# Patient Record
Sex: MALE | Race: WHITE | Employment: UNEMPLOYED | URBAN - METROPOLITAN AREA
[De-identification: names, ages, dates, MRNs, and addresses within clinical notes are randomized per-mention and may not be internally consistent; named-entity substitution may affect disease eponyms.]

---

## 2021-08-06 ENCOUNTER — APPOINTMENT (OUTPATIENT)
Dept: CT IMAGING | Age: 26
DRG: 897 | End: 2021-08-06
Payer: COMMERCIAL

## 2021-08-06 ENCOUNTER — HOSPITAL ENCOUNTER (INPATIENT)
Age: 26
LOS: 2 days | Discharge: HOME | DRG: 897 | End: 2021-08-08
Attending: EMERGENCY MEDICINE | Admitting: FAMILY MEDICINE
Payer: COMMERCIAL

## 2021-08-06 DIAGNOSIS — R56.9 SEIZURES (HCC): ICD-10-CM

## 2021-08-06 DIAGNOSIS — F10.939 ALCOHOL WITHDRAWAL SYNDROME WITH COMPLICATION (HCC): Primary | ICD-10-CM

## 2021-08-06 LAB
ACETAMINOPHEN LEVEL: 11.1 MCG/ML (ref 10–30)
ALBUMIN SERPL-MCNC: 4.8 G/DL (ref 3.5–5.2)
ALP BLD-CCNC: 120 U/L (ref 40–129)
ALT SERPL-CCNC: 47 U/L (ref 0–40)
AMPHETAMINE SCREEN, URINE: NOT DETECTED
ANION GAP SERPL CALCULATED.3IONS-SCNC: 11 MMOL/L (ref 7–16)
AST SERPL-CCNC: 39 U/L (ref 0–39)
BACTERIA: ABNORMAL /HPF
BARBITURATE SCREEN URINE: NOT DETECTED
BASOPHILS ABSOLUTE: 0.06 E9/L (ref 0–0.2)
BASOPHILS RELATIVE PERCENT: 0.8 % (ref 0–2)
BENZODIAZEPINE SCREEN, URINE: NOT DETECTED
BILIRUB SERPL-MCNC: 1 MG/DL (ref 0–1.2)
BILIRUBIN URINE: ABNORMAL
BLOOD, URINE: ABNORMAL
BUN BLDV-MCNC: 15 MG/DL (ref 6–20)
CALCIUM SERPL-MCNC: 9.9 MG/DL (ref 8.6–10.2)
CANNABINOID SCREEN URINE: POSITIVE
CHLORIDE BLD-SCNC: 96 MMOL/L (ref 98–107)
CLARITY: CLEAR
CO2: 32 MMOL/L (ref 22–29)
COCAINE METABOLITE SCREEN URINE: NOT DETECTED
COLOR: ABNORMAL
CREAT SERPL-MCNC: 0.8 MG/DL (ref 0.7–1.2)
EOSINOPHILS ABSOLUTE: 0.4 E9/L (ref 0.05–0.5)
EOSINOPHILS RELATIVE PERCENT: 5.4 % (ref 0–6)
EPITHELIAL CELLS, UA: ABNORMAL /HPF
ETHANOL: <10 MG/DL (ref 0–0.08)
FENTANYL SCREEN, URINE: NOT DETECTED
GFR AFRICAN AMERICAN: >60
GFR NON-AFRICAN AMERICAN: >60 ML/MIN/1.73
GLUCOSE BLD-MCNC: 107 MG/DL (ref 74–99)
GLUCOSE URINE: 100 MG/DL
HCT VFR BLD CALC: 45.1 % (ref 37–54)
HEMOGLOBIN: 15.9 G/DL (ref 12.5–16.5)
IMMATURE GRANULOCYTES #: 0.01 E9/L
IMMATURE GRANULOCYTES %: 0.1 % (ref 0–5)
KETONES, URINE: ABNORMAL MG/DL
LACTIC ACID: 0.9 MMOL/L (ref 0.5–2.2)
LEUKOCYTE ESTERASE, URINE: NEGATIVE
LYMPHOCYTES ABSOLUTE: 2.9 E9/L (ref 1.5–4)
LYMPHOCYTES RELATIVE PERCENT: 39.4 % (ref 20–42)
Lab: ABNORMAL
MAGNESIUM: 2 MG/DL (ref 1.6–2.6)
MCH RBC QN AUTO: 32.1 PG (ref 26–35)
MCHC RBC AUTO-ENTMCNC: 35.3 % (ref 32–34.5)
MCV RBC AUTO: 91.1 FL (ref 80–99.9)
METHADONE SCREEN, URINE: NOT DETECTED
MONOCYTES ABSOLUTE: 0.86 E9/L (ref 0.1–0.95)
MONOCYTES RELATIVE PERCENT: 11.7 % (ref 2–12)
NEUTROPHILS ABSOLUTE: 3.13 E9/L (ref 1.8–7.3)
NEUTROPHILS RELATIVE PERCENT: 42.6 % (ref 43–80)
NITRITE, URINE: NEGATIVE
OPIATE SCREEN URINE: NOT DETECTED
OXYCODONE URINE: NOT DETECTED
PDW BLD-RTO: 11.3 FL (ref 11.5–15)
PH UA: 8.5 (ref 5–9)
PHENCYCLIDINE SCREEN URINE: NOT DETECTED
PLATELET # BLD: 213 E9/L (ref 130–450)
PMV BLD AUTO: 9.3 FL (ref 7–12)
POTASSIUM SERPL-SCNC: 3.4 MMOL/L (ref 3.5–5)
PROTEIN UA: 30 MG/DL
RBC # BLD: 4.95 E12/L (ref 3.8–5.8)
RBC UA: ABNORMAL /HPF (ref 0–2)
SALICYLATE, SERUM: <0.3 MG/DL (ref 0–30)
SODIUM BLD-SCNC: 139 MMOL/L (ref 132–146)
SPECIFIC GRAVITY UA: 1.01 (ref 1–1.03)
TOTAL PROTEIN: 7.2 G/DL (ref 6.4–8.3)
TRICYCLIC ANTIDEPRESSANTS SCREEN SERUM: NEGATIVE NG/ML
TROPONIN, HIGH SENSITIVITY: <6 NG/L (ref 0–11)
UROBILINOGEN, URINE: 1 E.U./DL
WBC # BLD: 7.4 E9/L (ref 4.5–11.5)
WBC UA: ABNORMAL /HPF (ref 0–5)

## 2021-08-06 PROCEDURE — 85025 COMPLETE CBC W/AUTO DIFF WBC: CPT

## 2021-08-06 PROCEDURE — 6370000000 HC RX 637 (ALT 250 FOR IP): Performed by: EMERGENCY MEDICINE

## 2021-08-06 PROCEDURE — 82077 ASSAY SPEC XCP UR&BREATH IA: CPT

## 2021-08-06 PROCEDURE — 70450 CT HEAD/BRAIN W/O DYE: CPT

## 2021-08-06 PROCEDURE — 93005 ELECTROCARDIOGRAM TRACING: CPT | Performed by: NURSE PRACTITIONER

## 2021-08-06 PROCEDURE — 80179 DRUG ASSAY SALICYLATE: CPT

## 2021-08-06 PROCEDURE — 83735 ASSAY OF MAGNESIUM: CPT

## 2021-08-06 PROCEDURE — 6360000002 HC RX W HCPCS: Performed by: NURSE PRACTITIONER

## 2021-08-06 PROCEDURE — 84484 ASSAY OF TROPONIN QUANT: CPT

## 2021-08-06 PROCEDURE — 81001 URINALYSIS AUTO W/SCOPE: CPT

## 2021-08-06 PROCEDURE — 80307 DRUG TEST PRSMV CHEM ANLYZR: CPT

## 2021-08-06 PROCEDURE — 80053 COMPREHEN METABOLIC PANEL: CPT

## 2021-08-06 PROCEDURE — 83605 ASSAY OF LACTIC ACID: CPT

## 2021-08-06 PROCEDURE — 2140000000 HC CCU INTERMEDIATE R&B

## 2021-08-06 PROCEDURE — 99283 EMERGENCY DEPT VISIT LOW MDM: CPT

## 2021-08-06 PROCEDURE — 80143 DRUG ASSAY ACETAMINOPHEN: CPT

## 2021-08-06 PROCEDURE — 72125 CT NECK SPINE W/O DYE: CPT

## 2021-08-06 PROCEDURE — 6360000002 HC RX W HCPCS: Performed by: EMERGENCY MEDICINE

## 2021-08-06 RX ORDER — POTASSIUM CHLORIDE 20 MEQ/1
40 TABLET, EXTENDED RELEASE ORAL ONCE
Status: COMPLETED | OUTPATIENT
Start: 2021-08-06 | End: 2021-08-06

## 2021-08-06 RX ORDER — MAGNESIUM HYDROXIDE/ALUMINUM HYDROXICE/SIMETHICONE 120; 1200; 1200 MG/30ML; MG/30ML; MG/30ML
30 SUSPENSION ORAL
Status: DISCONTINUED | OUTPATIENT
Start: 2021-08-07 | End: 2021-08-08 | Stop reason: HOSPADM

## 2021-08-06 RX ORDER — PANTOPRAZOLE SODIUM 40 MG/10ML
40 INJECTION, POWDER, LYOPHILIZED, FOR SOLUTION INTRAVENOUS DAILY
Status: DISCONTINUED | OUTPATIENT
Start: 2021-08-07 | End: 2021-08-08

## 2021-08-06 RX ORDER — OXCARBAZEPINE 300 MG/1
300 TABLET, FILM COATED ORAL 2 TIMES DAILY
Status: DISCONTINUED | OUTPATIENT
Start: 2021-08-07 | End: 2021-08-08 | Stop reason: HOSPADM

## 2021-08-06 RX ORDER — OXCARBAZEPINE 300 MG/1
300 TABLET, FILM COATED ORAL 2 TIMES DAILY
COMMUNITY

## 2021-08-06 RX ORDER — GAUZE BANDAGE 2" X 2"
100 BANDAGE TOPICAL DAILY
Status: DISCONTINUED | OUTPATIENT
Start: 2021-08-07 | End: 2021-08-08 | Stop reason: HOSPADM

## 2021-08-06 RX ORDER — SODIUM CHLORIDE 9 MG/ML
25 INJECTION, SOLUTION INTRAVENOUS PRN
Status: DISCONTINUED | OUTPATIENT
Start: 2021-08-06 | End: 2021-08-08 | Stop reason: HOSPADM

## 2021-08-06 RX ORDER — LORAZEPAM 2 MG/ML
3 INJECTION INTRAMUSCULAR
Status: DISCONTINUED | OUTPATIENT
Start: 2021-08-06 | End: 2021-08-08 | Stop reason: HOSPADM

## 2021-08-06 RX ORDER — ESCITALOPRAM OXALATE 10 MG/1
10 TABLET ORAL DAILY
Status: DISCONTINUED | OUTPATIENT
Start: 2021-08-07 | End: 2021-08-08 | Stop reason: HOSPADM

## 2021-08-06 RX ORDER — LORAZEPAM 1 MG/1
4 TABLET ORAL
Status: DISCONTINUED | OUTPATIENT
Start: 2021-08-06 | End: 2021-08-08 | Stop reason: HOSPADM

## 2021-08-06 RX ORDER — SODIUM CHLORIDE 0.9 % (FLUSH) 0.9 %
5-40 SYRINGE (ML) INJECTION PRN
Status: DISCONTINUED | OUTPATIENT
Start: 2021-08-06 | End: 2021-08-08 | Stop reason: HOSPADM

## 2021-08-06 RX ORDER — LORAZEPAM 2 MG/ML
4 INJECTION INTRAMUSCULAR
Status: DISCONTINUED | OUTPATIENT
Start: 2021-08-06 | End: 2021-08-08 | Stop reason: HOSPADM

## 2021-08-06 RX ORDER — FOLIC ACID 1 MG/1
1 TABLET ORAL ONCE
Status: COMPLETED | OUTPATIENT
Start: 2021-08-06 | End: 2021-08-06

## 2021-08-06 RX ORDER — SODIUM CHLORIDE 0.9 % (FLUSH) 0.9 %
5-40 SYRINGE (ML) INJECTION PRN
Status: DISCONTINUED | OUTPATIENT
Start: 2021-08-06 | End: 2021-08-06 | Stop reason: SDUPTHER

## 2021-08-06 RX ORDER — LORAZEPAM 1 MG/1
3 TABLET ORAL
Status: DISCONTINUED | OUTPATIENT
Start: 2021-08-06 | End: 2021-08-08 | Stop reason: HOSPADM

## 2021-08-06 RX ORDER — LORAZEPAM 1 MG/1
2 TABLET ORAL
Status: DISCONTINUED | OUTPATIENT
Start: 2021-08-06 | End: 2021-08-08 | Stop reason: HOSPADM

## 2021-08-06 RX ORDER — MULTIVITAMIN WITH IRON
1 TABLET ORAL DAILY
Status: DISCONTINUED | OUTPATIENT
Start: 2021-08-07 | End: 2021-08-08 | Stop reason: HOSPADM

## 2021-08-06 RX ORDER — SODIUM CHLORIDE 0.9 % (FLUSH) 0.9 %
5-40 SYRINGE (ML) INJECTION EVERY 12 HOURS SCHEDULED
Status: DISCONTINUED | OUTPATIENT
Start: 2021-08-07 | End: 2021-08-08 | Stop reason: HOSPADM

## 2021-08-06 RX ORDER — ESCITALOPRAM OXALATE 10 MG/1
10 TABLET ORAL DAILY
COMMUNITY

## 2021-08-06 RX ORDER — SODIUM CHLORIDE 0.9 % (FLUSH) 0.9 %
5-40 SYRINGE (ML) INJECTION EVERY 12 HOURS SCHEDULED
Status: DISCONTINUED | OUTPATIENT
Start: 2021-08-06 | End: 2021-08-06 | Stop reason: SDUPTHER

## 2021-08-06 RX ORDER — LORAZEPAM 2 MG/ML
1 INJECTION INTRAMUSCULAR
Status: DISCONTINUED | OUTPATIENT
Start: 2021-08-06 | End: 2021-08-08 | Stop reason: HOSPADM

## 2021-08-06 RX ORDER — LORAZEPAM 1 MG/1
1 TABLET ORAL
Status: DISCONTINUED | OUTPATIENT
Start: 2021-08-06 | End: 2021-08-08 | Stop reason: HOSPADM

## 2021-08-06 RX ORDER — LORAZEPAM 2 MG/ML
2 INJECTION INTRAMUSCULAR
Status: DISCONTINUED | OUTPATIENT
Start: 2021-08-06 | End: 2021-08-08 | Stop reason: HOSPADM

## 2021-08-06 RX ORDER — SODIUM CHLORIDE 9 MG/ML
25 INJECTION, SOLUTION INTRAVENOUS PRN
Status: DISCONTINUED | OUTPATIENT
Start: 2021-08-06 | End: 2021-08-06 | Stop reason: SDUPTHER

## 2021-08-06 RX ORDER — SODIUM CHLORIDE 9 MG/ML
10 INJECTION INTRAVENOUS DAILY
Status: DISCONTINUED | OUTPATIENT
Start: 2021-08-07 | End: 2021-08-08 | Stop reason: HOSPADM

## 2021-08-06 RX ORDER — THIAMINE HYDROCHLORIDE 100 MG/ML
100 INJECTION, SOLUTION INTRAMUSCULAR; INTRAVENOUS ONCE
Status: COMPLETED | OUTPATIENT
Start: 2021-08-06 | End: 2021-08-06

## 2021-08-06 RX ADMIN — FOLIC ACID 1 MG: 1 TABLET ORAL at 22:03

## 2021-08-06 RX ADMIN — THIAMINE HYDROCHLORIDE 100 MG: 100 INJECTION, SOLUTION INTRAMUSCULAR; INTRAVENOUS at 22:02

## 2021-08-06 RX ADMIN — LORAZEPAM 2 MG: 2 INJECTION INTRAMUSCULAR; INTRAVENOUS at 22:02

## 2021-08-06 RX ADMIN — POTASSIUM CHLORIDE 40 MEQ: 1500 TABLET, EXTENDED RELEASE ORAL at 22:03

## 2021-08-06 ASSESSMENT — PAIN DESCRIPTION - LOCATION
LOCATION: ABDOMEN;HEAD;LEG
LOCATION: ABDOMEN

## 2021-08-06 ASSESSMENT — PAIN DESCRIPTION - PAIN TYPE
TYPE: ACUTE PAIN
TYPE: ACUTE PAIN

## 2021-08-06 ASSESSMENT — PAIN SCALES - GENERAL
PAINLEVEL_OUTOF10: 5
PAINLEVEL_OUTOF10: 6

## 2021-08-06 ASSESSMENT — PAIN DESCRIPTION - DESCRIPTORS: DESCRIPTORS: STABBING

## 2021-08-06 NOTE — ED NOTES
FIRST PROVIDER CONTACT ASSESSMENT NOTE                                                                                                Department of Emergency Medicine                                                      First Provider Note  21  6:34 PM EDT  NAME: Miley Paulino  : 1995  MRN: [id number]    Chief Complaint: Alcohol Problem (drinking since Monday, no alcohol in two days, had 2 seizures today.) and Seizures      History of Present Illness:   Miley Paulino is a 23 y.o. male who presents to the ED for seizures related to alcohol withdrawal.  Patient reports that he was sober/clean for 90 days but relapsed on Monday, going back to drinking alcohol, 2/5 of whiskey a day. He reports that he did not drink the last 2 days and today he has had 3 seizures. Patient denies any drug use. Focused Physical Exam:  VS:    ED Triage Vitals   BP Temp Temp src Pulse Resp SpO2 Height Weight   21 1833 21 -- 21 18121 1833 08/06/21 18121 1833 08/06/21 1833   (!) 127/101 98.2 °F (36.8 °C)  88 16 95 % 5' 7\" (1.702 m) 130 lb (59 kg)        General: Alert and in no apparent distress. Medical History:  has no past medical history on file. Surgical History:  has no past surgical history on file. Social History:      Family History: family history is not on file. Allergies: Patient has no known allergies.      Initial Plan of Care:  Initiate Treatment-Testing, Proceed toTreatment Area When Bed Available for ED Attending/MLP to Continue Care    -------------------------------------------------END OF FIRST PROVIDER CONTACT ASSESSMENT NOTE--------------------------------------------------------  Electronically signed by SANDRA Stewart CNP   DD: 21         SANDRA Stewart CNP  21 1835

## 2021-08-07 LAB
ALBUMIN SERPL-MCNC: 4.6 G/DL (ref 3.5–5.2)
ALP BLD-CCNC: 103 U/L (ref 40–129)
ALT SERPL-CCNC: 43 U/L (ref 0–40)
ANION GAP SERPL CALCULATED.3IONS-SCNC: 10 MMOL/L (ref 7–16)
AST SERPL-CCNC: 39 U/L (ref 0–39)
BASOPHILS ABSOLUTE: 0.06 E9/L (ref 0–0.2)
BASOPHILS RELATIVE PERCENT: 0.7 % (ref 0–2)
BILIRUB SERPL-MCNC: 0.9 MG/DL (ref 0–1.2)
BUN BLDV-MCNC: 16 MG/DL (ref 6–20)
CALCIUM SERPL-MCNC: 9.7 MG/DL (ref 8.6–10.2)
CHLORIDE BLD-SCNC: 99 MMOL/L (ref 98–107)
CO2: 30 MMOL/L (ref 22–29)
CREAT SERPL-MCNC: 0.9 MG/DL (ref 0.7–1.2)
EOSINOPHILS ABSOLUTE: 0.5 E9/L (ref 0.05–0.5)
EOSINOPHILS RELATIVE PERCENT: 5.7 % (ref 0–6)
GFR AFRICAN AMERICAN: >60
GFR NON-AFRICAN AMERICAN: >60 ML/MIN/1.73
GLUCOSE BLD-MCNC: 89 MG/DL (ref 74–99)
HCT VFR BLD CALC: 43.3 % (ref 37–54)
HEMOGLOBIN: 15.2 G/DL (ref 12.5–16.5)
IMMATURE GRANULOCYTES #: 0.01 E9/L
IMMATURE GRANULOCYTES %: 0.1 % (ref 0–5)
LIPASE: 20 U/L (ref 13–60)
LYMPHOCYTES ABSOLUTE: 3.28 E9/L (ref 1.5–4)
LYMPHOCYTES RELATIVE PERCENT: 37.2 % (ref 20–42)
MCH RBC QN AUTO: 31.9 PG (ref 26–35)
MCHC RBC AUTO-ENTMCNC: 35.1 % (ref 32–34.5)
MCV RBC AUTO: 91 FL (ref 80–99.9)
MONOCYTES ABSOLUTE: 0.83 E9/L (ref 0.1–0.95)
MONOCYTES RELATIVE PERCENT: 9.4 % (ref 2–12)
NEUTROPHILS ABSOLUTE: 4.13 E9/L (ref 1.8–7.3)
NEUTROPHILS RELATIVE PERCENT: 46.9 % (ref 43–80)
PDW BLD-RTO: 11.3 FL (ref 11.5–15)
PLATELET # BLD: 192 E9/L (ref 130–450)
PMV BLD AUTO: 9.5 FL (ref 7–12)
POTASSIUM REFLEX MAGNESIUM: 3.8 MMOL/L (ref 3.5–5)
RBC # BLD: 4.76 E12/L (ref 3.8–5.8)
SODIUM BLD-SCNC: 139 MMOL/L (ref 132–146)
TOTAL PROTEIN: 6.7 G/DL (ref 6.4–8.3)
WBC # BLD: 8.8 E9/L (ref 4.5–11.5)

## 2021-08-07 PROCEDURE — 2140000000 HC CCU INTERMEDIATE R&B

## 2021-08-07 PROCEDURE — 85025 COMPLETE CBC W/AUTO DIFF WBC: CPT

## 2021-08-07 PROCEDURE — 80053 COMPREHEN METABOLIC PANEL: CPT

## 2021-08-07 PROCEDURE — 2500000003 HC RX 250 WO HCPCS: Performed by: FAMILY MEDICINE

## 2021-08-07 PROCEDURE — 2580000003 HC RX 258: Performed by: STUDENT IN AN ORGANIZED HEALTH CARE EDUCATION/TRAINING PROGRAM

## 2021-08-07 PROCEDURE — 6370000000 HC RX 637 (ALT 250 FOR IP): Performed by: STUDENT IN AN ORGANIZED HEALTH CARE EDUCATION/TRAINING PROGRAM

## 2021-08-07 PROCEDURE — C9113 INJ PANTOPRAZOLE SODIUM, VIA: HCPCS | Performed by: FAMILY MEDICINE

## 2021-08-07 PROCEDURE — 2580000003 HC RX 258: Performed by: FAMILY MEDICINE

## 2021-08-07 PROCEDURE — 6360000002 HC RX W HCPCS: Performed by: STUDENT IN AN ORGANIZED HEALTH CARE EDUCATION/TRAINING PROGRAM

## 2021-08-07 PROCEDURE — 36415 COLL VENOUS BLD VENIPUNCTURE: CPT

## 2021-08-07 PROCEDURE — 83690 ASSAY OF LIPASE: CPT

## 2021-08-07 PROCEDURE — 6360000002 HC RX W HCPCS: Performed by: FAMILY MEDICINE

## 2021-08-07 PROCEDURE — 6370000000 HC RX 637 (ALT 250 FOR IP): Performed by: FAMILY MEDICINE

## 2021-08-07 RX ORDER — POTASSIUM CHLORIDE 20 MEQ/1
40 TABLET, EXTENDED RELEASE ORAL 2 TIMES DAILY WITH MEALS
Status: DISCONTINUED | OUTPATIENT
Start: 2021-08-07 | End: 2021-08-07

## 2021-08-07 RX ORDER — ACETAMINOPHEN 325 MG/1
650 TABLET ORAL EVERY 4 HOURS PRN
Status: DISCONTINUED | OUTPATIENT
Start: 2021-08-07 | End: 2021-08-08 | Stop reason: HOSPADM

## 2021-08-07 RX ORDER — SODIUM CHLORIDE 9 MG/ML
INJECTION, SOLUTION INTRAVENOUS CONTINUOUS
Status: DISCONTINUED | OUTPATIENT
Start: 2021-08-07 | End: 2021-08-08 | Stop reason: HOSPADM

## 2021-08-07 RX ADMIN — ESCITALOPRAM 10 MG: 10 TABLET, FILM COATED ORAL at 09:28

## 2021-08-07 RX ADMIN — SODIUM CHLORIDE: 9 INJECTION, SOLUTION INTRAVENOUS at 19:16

## 2021-08-07 RX ADMIN — SODIUM CHLORIDE, PRESERVATIVE FREE 10 ML: 5 INJECTION INTRAVENOUS at 00:56

## 2021-08-07 RX ADMIN — OXCARBAZEPINE 300 MG: 300 TABLET, FILM COATED ORAL at 09:28

## 2021-08-07 RX ADMIN — POTASSIUM CHLORIDE 40 MEQ: 1500 TABLET, EXTENDED RELEASE ORAL at 08:45

## 2021-08-07 RX ADMIN — Medication 1 TABLET: at 16:42

## 2021-08-07 RX ADMIN — FOLIC ACID: 5 INJECTION, SOLUTION INTRAMUSCULAR; INTRAVENOUS; SUBCUTANEOUS at 00:57

## 2021-08-07 RX ADMIN — MAGNESIUM HYDROXIDE/ALUMINUM HYDROXICE/SIMETHICONE 30 ML: 120; 1200; 1200 SUSPENSION ORAL at 00:56

## 2021-08-07 RX ADMIN — LORAZEPAM 2 MG: 1 TABLET ORAL at 09:27

## 2021-08-07 RX ADMIN — PANTOPRAZOLE SODIUM 40 MG: 40 INJECTION, POWDER, FOR SOLUTION INTRAVENOUS at 08:21

## 2021-08-07 RX ADMIN — MAGNESIUM HYDROXIDE/ALUMINUM HYDROXICE/SIMETHICONE 30 ML: 120; 1200; 1200 SUSPENSION ORAL at 08:20

## 2021-08-07 RX ADMIN — SODIUM CHLORIDE: 9 INJECTION, SOLUTION INTRAVENOUS at 08:23

## 2021-08-07 RX ADMIN — MAGNESIUM HYDROXIDE/ALUMINUM HYDROXICE/SIMETHICONE 30 ML: 120; 1200; 1200 SUSPENSION ORAL at 17:24

## 2021-08-07 RX ADMIN — ENOXAPARIN SODIUM 40 MG: 40 INJECTION SUBCUTANEOUS at 12:17

## 2021-08-07 RX ADMIN — OXCARBAZEPINE 300 MG: 300 TABLET, FILM COATED ORAL at 20:39

## 2021-08-07 RX ADMIN — OXCARBAZEPINE 300 MG: 300 TABLET, FILM COATED ORAL at 00:56

## 2021-08-07 RX ADMIN — MAGNESIUM HYDROXIDE/ALUMINUM HYDROXICE/SIMETHICONE 30 ML: 120; 1200; 1200 SUSPENSION ORAL at 20:38

## 2021-08-07 RX ADMIN — LORAZEPAM 2 MG: 1 TABLET ORAL at 06:46

## 2021-08-07 RX ADMIN — SODIUM CHLORIDE 10 ML: 9 INJECTION, SOLUTION INTRAMUSCULAR; INTRAVENOUS; SUBCUTANEOUS at 08:21

## 2021-08-07 RX ADMIN — LORAZEPAM 2 MG: 1 TABLET ORAL at 08:20

## 2021-08-07 RX ADMIN — ACETAMINOPHEN 650 MG: 325 TABLET ORAL at 08:38

## 2021-08-07 RX ADMIN — Medication 100 MG: at 09:28

## 2021-08-07 RX ADMIN — MAGNESIUM HYDROXIDE/ALUMINUM HYDROXICE/SIMETHICONE 30 ML: 120; 1200; 1200 SUSPENSION ORAL at 12:00

## 2021-08-07 RX ADMIN — Medication 10 ML: at 20:39

## 2021-08-07 RX ADMIN — Medication 10 ML: at 08:46

## 2021-08-07 ASSESSMENT — PAIN DESCRIPTION - LOCATION
LOCATION: BACK
LOCATION: BACK;HEAD
LOCATION: HEAD

## 2021-08-07 ASSESSMENT — PAIN SCALES - GENERAL
PAINLEVEL_OUTOF10: 6
PAINLEVEL_OUTOF10: 5
PAINLEVEL_OUTOF10: 0
PAINLEVEL_OUTOF10: 6
PAINLEVEL_OUTOF10: 0
PAINLEVEL_OUTOF10: 4
PAINLEVEL_OUTOF10: 0
PAINLEVEL_OUTOF10: 0

## 2021-08-07 ASSESSMENT — PAIN DESCRIPTION - DESCRIPTORS
DESCRIPTORS: STABBING;DISCOMFORT;CONSTANT
DESCRIPTORS: STABBING

## 2021-08-07 ASSESSMENT — PAIN DESCRIPTION - PAIN TYPE
TYPE: ACUTE PAIN

## 2021-08-07 NOTE — PLAN OF CARE
Problem: Pain:  Goal: Pain level will decrease  Description: Pain level will decrease  8/7/2021 1029 by Sharron Sandhoff, RN  Outcome: Met This Shift     Problem: Pain:  Goal: Control of acute pain  Description: Control of acute pain  Outcome: Met This Shift     Problem: Pain:  Goal: Control of chronic pain  Description: Control of chronic pain  Outcome: Met This Shift

## 2021-08-07 NOTE — ED NOTES
Provider at [facility] to assess at this time     Jeremiah Malave RN  08/06/21 1703

## 2021-08-07 NOTE — PLAN OF CARE
Problem: Pain:  Goal: Pain level will decrease  Description: Pain level will decrease  8/7/2021 1029 by Valdo Caldwell RN  Outcome: Met This Shift     Problem: Pain:  Goal: Control of acute pain  Description: Control of acute pain  Outcome: Met This Shift     Problem: Pain:  Goal: Control of chronic pain  Description: Control of chronic pain  8/7/2021 1538 by Valdo Caldwell RN  Outcome: Met This Shift     Problem: Falls - Risk of:  Goal: Will remain free from falls  Description: Will remain free from falls  8/7/2021 1538 by Valdo Caldwell RN  Outcome: Met This Shift

## 2021-08-07 NOTE — ED NOTES
[facility] faxed to floor, pt updated on Room assignment. Transport assigned. Floor called an made aware of pts arrival Pt with NAD upon admit.  Belongings sent with pt     Adilson Nelson RN  08/06/21 3798

## 2021-08-07 NOTE — PLAN OF CARE
Problem: Pain:  Goal: Pain level will decrease  Description: Pain level will decrease  Outcome: Ongoing     Problem: Falls - Risk of:  Goal: Will remain free from falls  Description: Will remain free from falls  Outcome: Met This Shift     Problem: Falls - Risk of:  Goal: Absence of physical injury  Description: Absence of physical injury  Outcome: Met This Shift

## 2021-08-07 NOTE — PROGRESS NOTES
Hospitalist Progress Note      SYNOPSIS: Patient admitted on 2021 for impending DT's. SUBJECTIVE:    Patient seen and examined at bedside in NAD. Noted to have mild tremors in B/L hands when outstretched; however, pt denies any anxiety, auditory or visual hallucinations. No diaphoresis and vitals stable. Records reviewed. Stable overnight. No other overnight issues reported. Temp (24hrs), Av.3 °F (36.8 °C), Min:97.6 °F (36.4 °C), Max:98.7 °F (37.1 °C)    DIET: ADULT DIET; Regular; 5 carb choices (75 gm/meal)  CODE: No Order  No intake or output data in the 24 hours ending 21 1158    OBJECTIVE:    /72   Pulse 65   Temp 98 °F (36.7 °C) (Temporal)   Resp 16   Ht 5' 7\" (1.702 m)   Wt 126 lb 9.6 oz (57.4 kg)   SpO2 96%   BMI 19.83 kg/m²     General appearance: No apparent distress, appears stated age and cooperative. HEENT:  Conjunctivae/corneas clear. Neck: Supple. No jugular venous distention. Respiratory: Clear to auscultation bilaterally, normal respiratory effort  Cardiovascular: Regular rate rhythm, normal S1-S2  Abdomen: Soft, nontender, nondistended  Musculoskeletal: No clubbing, cyanosis, no bilateral lower extremity edema. Brisk capillary refill. Skin:  No rashes  on visible skin  Neurologic: awake, alert and following commands.  Mild tremors of B/L hands when outstretched    ASSESSMENT and PLAN:    Acute alcohol withdrawal with seizure  - Pt with two unwitnessed seizures prior to admission  - CT head without acute process  - CT cervical spine no acute fracture  - UDS pos for THC  - Pt endorses drinking ~1 Bottle of whiskey daily with last ~48H prior to admission  - Cont on CIWA protocol with Ativan protocol  - Cont with folate and thiamine with multivitamins  - Lipase WNL  - C/w Trileptal for seizures  - Counseled at length and in detail on alcohol cessation  - SW for substance abuse      Depression  - C/w Lexapro  - No current SI or HI     DVT Prophylaxis: Lovenox  Diet: Regular  Code Status: Full     DISPOSITION: Intermediate    Medications:  REVIEWED DAILY    Infusion Medications    sodium chloride 125 mL/hr at 08/07/21 0823    sodium chloride       Scheduled Medications    potassium chloride  40 mEq Oral BID WC    escitalopram  10 mg Oral Daily    OXcarbazepine  300 mg Oral BID    sodium chloride flush  5-40 mL Intravenous 2 times per day    thiamine  100 mg Oral Daily    multivitamin  1 tablet Oral Daily    pantoprazole  40 mg Intravenous Daily    And    sodium chloride (PF)  10 mL Intravenous Daily    aluminum & magnesium hydroxide-simethicone  30 mL Oral 4x Daily WC     PRN Meds: acetaminophen, LORazepam **OR** LORazepam **OR** LORazepam **OR** LORazepam **OR** LORazepam **OR** LORazepam **OR** LORazepam **OR** LORazepam, sodium chloride flush, sodium chloride    Labs:     Recent Labs     08/06/21 1925 08/07/21 0527   WBC 7.4 8.8   HGB 15.9 15.2   HCT 45.1 43.3    192       Recent Labs     08/06/21 1925 08/07/21  0527    139   K 3.4* 3.8   CL 96* 99   CO2 32* 30*   BUN 15 16   CREATININE 0.8 0.9   CALCIUM 9.9 9.7       Recent Labs     08/06/21 1925 08/07/21  0527   PROT 7.2 6.7   ALKPHOS 120 103   ALT 47* 43*   AST 39 39   BILITOT 1.0 0.9   LIPASE  --  20       No results for input(s): INR in the last 72 hours. No results for input(s): Katlin Prater in the last 72 hours. Chronic labs:    No results found for: CHOL, TRIG, HDL, LDLCALC, TSH, PSA, INR, LABA1C    Radiology: REVIEWED DAILY    +++++++++++++++++++++++++++++++++++++++++++++++++  Angelito Ness MD  [facility] Physician - [address] [place] [address]  +++++++++++++++++++++++++++++++++++++++++++++++++  NOTE: This report was transcribed using voice recognition software. Every effort was made to ensure accuracy; however, inadvertent computerized transcription errors may be present.

## 2021-08-07 NOTE — H&P
Hospital Medicine History & Physical      PCP: No primary care provider on file. Date of Admission: 8/6/2021    Date of Service: Pt seen/examined on 8/6/2021 and Admitted to Inpatient with expected LOS greater than two midnights due to medical therapy. Chief Complaint:  Alcohol withdrawal       History Of Present Illness:   23 y.o. male who presents to the ED due to alcohol withdrawal . Patient was in rehab for one month and then was discharged to a sober house . He states that he was removed from the sober house for an unknown reason. He states that he started to binge drink . He states that he drinks 2/5 whiskey daily . He states that he had 2 two unwitnessed seizures  Today . He states that he has a history of alcoholic seizures . Roberth Igor He states that his last drink was 48H . He reports abdominal pain  nausea vomiting fever chills and shortness of breath . He states that he had hallucinations 3 days ago but denies hallucinating now. He denies chest pain  Patient is afebrile blood pressure 127/101  95 % on room air , RR=16 pulse =88  . ALT 47 , AST 39 Alk phos 96 Trop <6  bilirubin  1.0 UDS positive for THC Ethanol level is neg  CT Head  reveals no acute process Ct cervical spine no acute fracture     Past Medical History:          Diagnosis Date    Alcohol abuse        Past Surgical History:          Procedure Laterality Date    APPENDECTOMY         Medications Prior to Admission:      Prior to Admission medications    Medication Sig Start Date End Date Taking? Authorizing Provider   escitalopram (LEXAPRO) 10 MG tablet Take 10 mg by mouth daily   Yes Historical Provider, MD   OXcarbazepine (TRILEPTAL) 300 MG tablet Take 300 mg by mouth 2 times daily   Yes Historical Provider, MD       Allergies:  Patient has no known allergies. Social History:      The patient currently lives with family     TOBACCO:   reports that he has been smoking.  He has never used smokeless tobacco.  ETOH:   reports current alcohol use. Family History:       Reviewed in detail and negative for DM, CAD, Cancer, CVA. Positive as follows:    History reviewed. No pertinent family history. REVIEW OF SYSTEMS:   Pertinent positives as noted in the HPI. All other systems reviewed and negative. PHYSICAL EXAM:    BP (!) 127/101   Pulse 88   Temp 98.2 °F (36.8 °C)   Resp 16   Ht 5' 7\" (1.702 m)   Wt 130 lb (59 kg)   SpO2 95%   BMI 20.36 kg/m²     General appearance:  No apparent distress, appears stated age and cooperative. HEENT:  Normal cephalic, atraumatic without obvious deformity. Pupils equal, round, and reactive to light. Extra ocular muscles intact. Conjunctivae/corneas clear. Neck: Supple, with full range of motion. No jugular venous distention. Trachea midline. Respiratory:  Normal respiratory effort. Clear to auscultation, bilaterally without Rales/Wheezes/Rhonchi. Cardiovascular:  Regular rate and rhythm with normal S1/S2 without murmurs, rubs or gallops. Abdomen: Soft, non-tender, non-distended with normal bowel sounds. Musculoskeletal:  No clubbing, cyanosis or edema bilaterally. Full range of motion without deformity. Skin: Skin color, texture, turgor normal.  No rashes or lesions. Neurologic:  Neurovascularly intact without any focal sensory/motor deficits. Cranial nerves: II-XII intact, grossly non-focal.  Psychiatric:  Alert and oriented, thought content appropriate, normal insight    Labs:     Recent Labs     08/06/21 1925   WBC 7.4   HGB 15.9   HCT 45.1        Recent Labs     08/06/21 1925      K 3.4*   CL 96*   CO2 32*   BUN 15   CREATININE 0.8   CALCIUM 9.9     Recent Labs     08/06/21 1925   AST 39   ALT 47*   BILITOT 1.0   ALKPHOS 120     No results for input(s): INR in the last 72 hours. No results for input(s): Reyne Uzbek in the last 72 hours.     Urinalysis:      Lab Results   Component Value Date    NITRU Negative 08/06/2021    WBCUA 5-10 08/06/2021    BACTERIA MODERATE 08/06/2021    RBCUA 2-5 08/06/2021    BLOODU TRACE-INTACT 08/06/2021    SPECGRAV 1.010 08/06/2021    GLUCOSEU 100 08/06/2021         ASSESSMENT:        Active Hospital Problems    Diagnosis Date Noted    Alcohol withdrawal seizure with complication ([facility] [address] [S98.840, R56.9] 08/06/2021       PLAN:     Acute alcohol withdrawal with seizure  ; Patient had two unwitnessed seizures . CT head no acute process. CT cervical spine no acute fracture . UDS pos for THC He drinks 2/5 whiskey daily last drink was  48H ago .  Admit inpatient vital telemetry seizure precaution  CIWA protocol  Folic acid  thiamine multivitamins monitor for seizures C/w Trileptal     Depression : C/w Lexapro         DVT Prophylaxis: SCDS   Diet: Regular   Code Status: Full   *       Tate Zamora MD

## 2021-08-07 NOTE — PROGRESS NOTES
Dr. Didi Chaves regarding headache and K 3.8 this am. Kclor 40 mEq ordered as well as tylenol. Dr. Miller Alu to see patient before restarting diet.

## 2021-08-07 NOTE — PATIENT CARE CONFERENCE
P Quality Flow/Interdisciplinary Rounds Progress Note        Quality Flow Rounds held on August 7, 2021    Disciplines Attending:  Bedside Nurse, ,  and Nursing Unit Leadership    Tomer Dempsey was admitted on 8/6/2021  8:45 PM    Anticipated Discharge Date:  Expected Discharge Date: 08/09/21    Disposition:    Johnny Score:  Johnny Scale Score: 21    Readmission Risk              Risk of Unplanned Readmission:  8           Discussed patient goal for the day, patient clinical progression, and barriers to discharge.   The following Goal(s) of the Day/Commitment(s) have been identified:  continue CIWA/IVF/vitamins      Candelaria Ortiz, RN  August 7, 2021

## 2021-08-07 NOTE — ED PROVIDER NOTES
HPI:  8/6/21,   Time: 9:50 PM EDT       Yady Lewis is a 23 y.o. male presenting to the ED for alcohol withdrawal, beginning 2 days ago. The complaint has been persistent, moderate in severity, and worsened by nothing. The patient states that he had been sober and then recently started drinking again. He states he drank for about 5 days straight drinking approximately 2/5 of whiskey daily. He states that he stopped drinking approximately 48 hours ago and had 2 witnessed seizures today therefore he came to the ED to be evaluated. He states he is feeling shaky nauseous having headaches and just not feeling good. Denies any chest pain or shortness of breath. No focal deficits. No SI or HI. No hallucinations. Review of Systems:   Pertinent positives and negatives are stated within HPI, all other systems reviewed and are negative.          --------------------------------------------- PAST HISTORY ---------------------------------------------  Past Medical History:  has a past medical history of Alcohol abuse. Past Surgical History:  has a past surgical history that includes Appendectomy. Social History:  reports that he has been smoking. He has never used smokeless tobacco. He reports current alcohol use. He reports that he does not use drugs. Family History: family history is not on file. The patients home medications have been reviewed. Allergies: Patient has no known allergies.         ---------------------------------------------------PHYSICAL EXAM--------------------------------------    Constitutional/General: Alert and oriented x3, well appearing, non toxic in NAD  Head: Normocephalic and atraumatic  Eyes: PERRL, EOMI, conjunctive normal, sclera non icteric  Mouth: Oropharynx clear, handling secretions, no trismus, no asymmetry of the posterior oropharynx or uvular edema  Neck: Supple, full ROM, non tender to palpation in the midline, no stridor, no crepitus, no meningeal signs  Respiratory: Lungs clear to auscultation bilaterally, no wheezes, rales, or rhonchi. Not in respiratory distress  Cardiovascular:  Regular rate. Regular rhythm. No murmurs, gallops, or rubs. 2+ distal pulses  GI:  Abdomen Soft, Non tender, Non distended. +BS. No organomegaly, no palpable masses,  No rebound, guarding, or rigidity. Musculoskeletal: Moves all extremities x 4. Warm and well perfused, no clubbing, cyanosis, or edema. Capillary refill <3 seconds  Integument: skin warm and dry. No rashes. Neurologic: GCS 15, no focal deficits, symmetric strength 5/5 in the upper and lower extremities bilaterally, sensation intact all 4 extremities, cranial nerves II to XII intact  Psychiatric: Normal Affect    -------------------------------------------------- RESULTS -------------------------------------------------  I have personally reviewed all laboratory and imaging results for this patient. Results are listed below.      LABS:  Results for orders placed or performed during the hospital encounter of 08/06/21   Troponin   Result Value Ref Range    Troponin, High Sensitivity <6 0 - 11 ng/L   CBC Auto Differential   Result Value Ref Range    WBC 7.4 4.5 - 11.5 E9/L    RBC 4.95 3.80 - 5.80 E12/L    Hemoglobin 15.9 12.5 - 16.5 g/dL    Hematocrit 45.1 37.0 - 54.0 %    MCV 91.1 80.0 - 99.9 fL    MCH 32.1 26.0 - 35.0 pg    MCHC 35.3 (H) 32.0 - 34.5 %    RDW 11.3 (L) 11.5 - 15.0 fL    Platelets 672 228 - 981 E9/L    MPV 9.3 7.0 - 12.0 fL    Neutrophils % 42.6 (L) 43.0 - 80.0 %    Immature Granulocytes % 0.1 0.0 - 5.0 %    Lymphocytes % 39.4 20.0 - 42.0 %    Monocytes % 11.7 2.0 - 12.0 %    Eosinophils % 5.4 0.0 - 6.0 %    Basophils % 0.8 0.0 - 2.0 %    Neutrophils Absolute 3.13 1.80 - 7.30 E9/L    Immature Granulocytes # 0.01 E9/L    Lymphocytes Absolute 2.90 1.50 - 4.00 E9/L    Monocytes Absolute 0.86 0.10 - 0.95 E9/L    Eosinophils Absolute 0.40 0.05 - 0.50 E9/L    Basophils Absolute 0.06 0.00 - 0.20 E9/L Comprehensive Metabolic Panel   Result Value Ref Range    Sodium 139 132 - 146 mmol/L    Potassium 3.4 (L) 3.5 - 5.0 mmol/L    Chloride 96 (L) 98 - 107 mmol/L    CO2 32 (H) 22 - 29 mmol/L    Anion Gap 11 7 - 16 mmol/L    Glucose 107 (H) 74 - 99 mg/dL    BUN 15 6 - 20 mg/dL    CREATININE 0.8 0.7 - 1.2 mg/dL    GFR Non-African American >60 >=60 mL/min/1.73    GFR African American >60     Calcium 9.9 8.6 - 10.2 mg/dL    Total Protein 7.2 6.4 - 8.3 g/dL    Albumin 4.8 3.5 - 5.2 g/dL    Total Bilirubin 1.0 0.0 - 1.2 mg/dL    Alkaline Phosphatase 120 40 - 129 U/L    ALT 47 (H) 0 - 40 U/L    AST 39 0 - 39 U/L   Magnesium   Result Value Ref Range    Magnesium 2.0 1.6 - 2.6 mg/dL   Lactic Acid, Plasma   Result Value Ref Range    Lactic Acid 0.9 0.5 - 2.2 mmol/L   Urinalysis   Result Value Ref Range    Color, UA DARK YELLOW (A) Straw/Yellow    Clarity, UA Clear Clear    Glucose, Ur 100 (A) Negative mg/dL    Bilirubin Urine SMALL (A) Negative    Ketones, Urine TRACE (A) Negative mg/dL    Specific Gravity, UA 1.010 1.005 - 1.030    Blood, Urine TRACE-INTACT Negative    pH, UA 8.5 5.0 - 9.0    Protein, UA 30 (A) Negative mg/dL    Urobilinogen, Urine 1.0 <2.0 E.U./dL    Nitrite, Urine Negative Negative    Leukocyte Esterase, Urine Negative Negative   Urine Drug Screen   Result Value Ref Range    Amphetamine Screen, Urine NOT DETECTED Negative <1000 ng/mL    Barbiturate Screen, Ur NOT DETECTED Negative < 200 ng/mL    Benzodiazepine Screen, Urine NOT DETECTED Negative < 200 ng/mL    Cannabinoid Scrn, Ur POSITIVE (A) Negative < 50ng/mL    Cocaine Metabolite Screen, Urine NOT DETECTED Negative < 300 ng/mL    Opiate Scrn, Ur NOT DETECTED Negative < 300ng/mL    PCP Screen, Urine NOT DETECTED Negative < 25 ng/mL    Methadone Screen, Urine NOT DETECTED Negative <300 ng/mL    Oxycodone Urine NOT DETECTED Negative <100 ng/mL    FENTANYL SCREEN, URINE NOT DETECTED Negative <1 ng/mL    Drug Screen Comment: see below    Serum Drug Screen Result Value Ref Range    Ethanol Lvl <10 mg/dL    Acetaminophen Level 11.1 10.0 - 44.4 mcg/mL    Salicylate, Serum <4.7 0.0 - 30.0 mg/dL    TCA Scrn NEGATIVE Cutoff:300 ng/mL   Microscopic Urinalysis   Result Value Ref Range    WBC, UA 5-10 (A) 0 - 5 /HPF    RBC, UA 2-5 0 - 2 /HPF    Epithelial Cells, UA FEW /HPF    Bacteria, UA MODERATE (A) None Seen /HPF       RADIOLOGY:  Interpreted by Radiologist.  CT HEAD WO CONTRAST   Final Result   No acute intracranial abnormality. CT CERVICAL SPINE WO CONTRAST   Final Result   No acute abnormality of the cervical spine.                 ------------------------- NURSING NOTES AND VITALS REVIEWED ---------------------------   The nursing notes within the ED encounter and vital signs as below have been reviewed by myself. BP (!) 127/101   Pulse 88   Temp 98.2 °F (36.8 °C)   Resp 16   Ht 5' 7\" (1.702 m)   Wt 130 lb (59 kg)   SpO2 95%   BMI 20.36 kg/m²   Oxygen Saturation Interpretation: Normal    The patients available past medical records and past encounters were reviewed.         ------------------------------ ED COURSE/MEDICAL DECISION MAKING----------------------  Medications   sodium chloride flush 0.9 % injection 5-40 mL (has no administration in time range)   sodium chloride flush 0.9 % injection 5-40 mL (has no administration in time range)   0.9 % sodium chloride infusion (has no administration in time range)   LORazepam (ATIVAN) tablet 1 mg (has no administration in time range)     Or   LORazepam (ATIVAN) injection 1 mg (has no administration in time range)     Or   LORazepam (ATIVAN) tablet 2 mg (has no administration in time range)     Or   LORazepam (ATIVAN) injection 2 mg (has no administration in time range)     Or   LORazepam (ATIVAN) tablet 3 mg (has no administration in time range)     Or   LORazepam (ATIVAN) injection 3 mg (has no administration in time range)     Or   LORazepam (ATIVAN) tablet 4 mg (has no administration in time range) Or   LORazepam (ATIVAN) injection 4 mg (has no administration in time range)   folic acid (FOLVITE) tablet 1 mg (has no administration in time range)   thiamine (B-1) injection 100 mg (has no administration in time range)   potassium chloride (KLOR-CON M) extended release tablet 40 mEq (has no administration in time range)         ED COURSE:       Medical Decision Making: This is a 78-year-old male presents to the ED for alcohol withdrawal.  Upon arrival to the ED the patient appears nontoxic. GCS 15. Neurologically intact. No seizure activity in 6 hours. Patient underwent laboratory work-up which showed a normal CBC. Negative troponin. Potassium was 3.4 which was replaced. Magnesium level within normal limits. Patient positive for THC. Head CT and neck CT negative. Patient given thiamine and folic acid. Patient placed on CIWA protocol. Due to the patient having seizures the patient be admitted for further care. Case discussed with Dr. Sita Michelle who is excepted the patient for admission. I, Dr. Nico Cifuentes, am the primary provider for this encounter    This patient's ED course included: a personal history and physicial examination, re-evaluation prior to disposition, multiple bedside re-evaluations, IV medications, cardiac monitoring and continuous pulse oximetry    This patient has remained hemodynamically stable during their ED course. Re-Evaluations:             Re-evaluation. Patients symptoms are improving    Counseling: The emergency provider has spoken with the patient and discussed todays results, in addition to providing specific details for the plan of care and counseling regarding the diagnosis and prognosis. Questions are answered at this time and they are agreeable with the plan.       --------------------------------- IMPRESSION AND DISPOSITION ---------------------------------    IMPRESSION  1.  Alcohol withdrawal syndrome with complication (HCC)    2. Seizures ([facility] [address] DISPOSITION  Disposition: Admit to telemetry  Patient condition is stable    NOTE: This report was transcribed using voice recognition software.  Every effort was made to ensure accuracy; however, inadvertent computerized transcription errors may be present        Lakshmi Alberts DO  08/06/21 0282

## 2021-08-07 NOTE — PROGRESS NOTES
Patients cigarettes, vape, and game controller collected, placed in bag with optio label and placed in locked  med room. Patient aware of it being stored.

## 2021-08-08 VITALS
HEIGHT: 67 IN | BODY MASS INDEX: 19.87 KG/M2 | SYSTOLIC BLOOD PRESSURE: 121 MMHG | RESPIRATION RATE: 18 BRPM | TEMPERATURE: 97.6 F | OXYGEN SATURATION: 95 % | HEART RATE: 64 BPM | WEIGHT: 126.6 LBS | DIASTOLIC BLOOD PRESSURE: 75 MMHG

## 2021-08-08 LAB
ALBUMIN SERPL-MCNC: 3.6 G/DL (ref 3.5–5.2)
ALP BLD-CCNC: 76 U/L (ref 40–129)
ALT SERPL-CCNC: 26 U/L (ref 0–40)
ANION GAP SERPL CALCULATED.3IONS-SCNC: 9 MMOL/L (ref 7–16)
AST SERPL-CCNC: 18 U/L (ref 0–39)
BASOPHILS ABSOLUTE: 0.04 E9/L (ref 0–0.2)
BASOPHILS RELATIVE PERCENT: 0.6 % (ref 0–2)
BILIRUB SERPL-MCNC: 0.4 MG/DL (ref 0–1.2)
BUN BLDV-MCNC: 12 MG/DL (ref 6–20)
CALCIUM SERPL-MCNC: 7.7 MG/DL (ref 8.6–10.2)
CHLORIDE BLD-SCNC: 109 MMOL/L (ref 98–107)
CO2: 22 MMOL/L (ref 22–29)
CREAT SERPL-MCNC: 0.7 MG/DL (ref 0.7–1.2)
EKG ATRIAL RATE: 61 BPM
EKG P AXIS: 42 DEGREES
EKG P-R INTERVAL: 124 MS
EKG Q-T INTERVAL: 414 MS
EKG QRS DURATION: 100 MS
EKG QTC CALCULATION (BAZETT): 416 MS
EKG R AXIS: 55 DEGREES
EKG T AXIS: 38 DEGREES
EKG VENTRICULAR RATE: 61 BPM
EOSINOPHILS ABSOLUTE: 0.19 E9/L (ref 0.05–0.5)
EOSINOPHILS RELATIVE PERCENT: 2.9 % (ref 0–6)
GFR AFRICAN AMERICAN: >60
GFR NON-AFRICAN AMERICAN: >60 ML/MIN/1.73
GLUCOSE BLD-MCNC: 90 MG/DL (ref 74–99)
HCT VFR BLD CALC: 35.4 % (ref 37–54)
HEMOGLOBIN: 12.7 G/DL (ref 12.5–16.5)
IMMATURE GRANULOCYTES #: 0.02 E9/L
IMMATURE GRANULOCYTES %: 0.3 % (ref 0–5)
LYMPHOCYTES ABSOLUTE: 2.69 E9/L (ref 1.5–4)
LYMPHOCYTES RELATIVE PERCENT: 41.6 % (ref 20–42)
MCH RBC QN AUTO: 32.6 PG (ref 26–35)
MCHC RBC AUTO-ENTMCNC: 35.9 % (ref 32–34.5)
MCV RBC AUTO: 91 FL (ref 80–99.9)
MONOCYTES ABSOLUTE: 0.57 E9/L (ref 0.1–0.95)
MONOCYTES RELATIVE PERCENT: 8.8 % (ref 2–12)
NEUTROPHILS ABSOLUTE: 2.96 E9/L (ref 1.8–7.3)
NEUTROPHILS RELATIVE PERCENT: 45.8 % (ref 43–80)
PDW BLD-RTO: 11.3 FL (ref 11.5–15)
PLATELET # BLD: 161 E9/L (ref 130–450)
PMV BLD AUTO: 9.8 FL (ref 7–12)
POTASSIUM REFLEX MAGNESIUM: 3.9 MMOL/L (ref 3.5–5)
RBC # BLD: 3.89 E12/L (ref 3.8–5.8)
SODIUM BLD-SCNC: 140 MMOL/L (ref 132–146)
TOTAL PROTEIN: 5.4 G/DL (ref 6.4–8.3)
WBC # BLD: 6.5 E9/L (ref 4.5–11.5)

## 2021-08-08 PROCEDURE — 2580000003 HC RX 258: Performed by: STUDENT IN AN ORGANIZED HEALTH CARE EDUCATION/TRAINING PROGRAM

## 2021-08-08 PROCEDURE — 2580000003 HC RX 258: Performed by: FAMILY MEDICINE

## 2021-08-08 PROCEDURE — 6360000002 HC RX W HCPCS: Performed by: FAMILY MEDICINE

## 2021-08-08 PROCEDURE — 85025 COMPLETE CBC W/AUTO DIFF WBC: CPT

## 2021-08-08 PROCEDURE — 6360000002 HC RX W HCPCS: Performed by: STUDENT IN AN ORGANIZED HEALTH CARE EDUCATION/TRAINING PROGRAM

## 2021-08-08 PROCEDURE — 6370000000 HC RX 637 (ALT 250 FOR IP): Performed by: FAMILY MEDICINE

## 2021-08-08 PROCEDURE — C9113 INJ PANTOPRAZOLE SODIUM, VIA: HCPCS | Performed by: FAMILY MEDICINE

## 2021-08-08 PROCEDURE — 36415 COLL VENOUS BLD VENIPUNCTURE: CPT

## 2021-08-08 PROCEDURE — 80053 COMPREHEN METABOLIC PANEL: CPT

## 2021-08-08 RX ORDER — THIAMINE MONONITRATE (VIT B1) 100 MG
100 TABLET ORAL DAILY
Qty: 30 TABLET | Refills: 0 | Status: SHIPPED | OUTPATIENT
Start: 2021-08-09 | End: 2021-08-11

## 2021-08-08 RX ORDER — PANTOPRAZOLE SODIUM 40 MG/1
40 TABLET, DELAYED RELEASE ORAL
Status: DISCONTINUED | OUTPATIENT
Start: 2021-08-09 | End: 2021-08-08 | Stop reason: HOSPADM

## 2021-08-08 RX ORDER — MULTIVITAMIN WITH IRON
1 TABLET ORAL DAILY
Qty: 30 TABLET | Refills: 0 | Status: SHIPPED | OUTPATIENT
Start: 2021-08-09 | End: 2021-08-11

## 2021-08-08 RX ORDER — PANTOPRAZOLE SODIUM 40 MG/1
40 TABLET, DELAYED RELEASE ORAL
Qty: 30 TABLET | Refills: 3 | Status: SHIPPED | OUTPATIENT
Start: 2021-08-09 | End: 2021-08-11

## 2021-08-08 RX ADMIN — Medication 1 TABLET: at 08:12

## 2021-08-08 RX ADMIN — ENOXAPARIN SODIUM 40 MG: 40 INJECTION SUBCUTANEOUS at 08:12

## 2021-08-08 RX ADMIN — Medication 100 MG: at 08:11

## 2021-08-08 RX ADMIN — OXCARBAZEPINE 300 MG: 300 TABLET, FILM COATED ORAL at 08:11

## 2021-08-08 RX ADMIN — Medication 10 ML: at 08:12

## 2021-08-08 RX ADMIN — PANTOPRAZOLE SODIUM 40 MG: 40 INJECTION, POWDER, FOR SOLUTION INTRAVENOUS at 08:11

## 2021-08-08 RX ADMIN — SODIUM CHLORIDE: 9 INJECTION, SOLUTION INTRAVENOUS at 06:00

## 2021-08-08 RX ADMIN — SODIUM CHLORIDE 10 ML: 9 INJECTION, SOLUTION INTRAMUSCULAR; INTRAVENOUS; SUBCUTANEOUS at 08:11

## 2021-08-08 RX ADMIN — ESCITALOPRAM 10 MG: 10 TABLET, FILM COATED ORAL at 08:12

## 2021-08-08 RX ADMIN — MAGNESIUM HYDROXIDE/ALUMINUM HYDROXICE/SIMETHICONE 30 ML: 120; 1200; 1200 SUSPENSION ORAL at 08:11

## 2021-08-08 ASSESSMENT — PAIN SCALES - GENERAL
PAINLEVEL_OUTOF10: 0

## 2021-08-08 NOTE — CARE COORDINATION
8/8/21 CM Note: Received call from floor nurse that patient is being discharged today. Call placed to patient to discuss discharge plan. He resides in [place] with his father but was recently in [place] in a Sober Living Detox for Alcohol. He had been sober for 90days. He was getting ready to work when his sober living housing ran out. He is currently in [facility] and will discharge home to his friend, Isauro Lealcarroll. She will provide his transportation from the hospital. He is not interested in outpatient rehab/counceling information and states he has it already. He understands he does not have a pcp and the bedside nurse will make an appt for him to start following with a pcp. He does not have any other needs at this time.  Alyssia Arauz RN

## 2021-08-08 NOTE — PLAN OF CARE
Problem: Pain:  Goal: Pain level will decrease  Description: Pain level will decrease  8/8/2021 0753 by Guero Redd RN  Outcome: Met This Shift     Problem: Pain:  Goal: Control of acute pain  Description: Control of acute pain  8/8/2021 0753 by Guero Redd RN  Outcome: Met This Shift     Problem: Pain:  Goal: Control of chronic pain  Description: Control of chronic pain  Outcome: Met This Shift     Problem: Falls - Risk of:  Goal: Will remain free from falls  Description: Will remain free from falls  8/8/2021 0753 by Guero Redd RN  Outcome: Met This Shift

## 2021-08-08 NOTE — PATIENT CARE CONFERENCE
P Quality Flow/Interdisciplinary Rounds Progress Note        Quality Flow Rounds held on August 8, 2021    Disciplines Attending:  Bedside Nurse, ,  and Nursing Unit Leadership    Alexis Meyer was admitted on 8/6/2021  8:45 PM    Anticipated Discharge Date:  Expected Discharge Date: 08/09/21    Disposition:    Johnny Score:  Johnny Scale Score: 21    Readmission Risk              Risk of Unplanned Readmission:  10           Discussed patient goal for the day, patient clinical progression, and barriers to discharge.   The following Goal(s) of the Day/Commitment(s) have been identified:  SW follow up for d/c planning wean FESTUS Garcia RN  August 8, 2021

## 2021-08-08 NOTE — DISCHARGE SUMMARY
Hospital Medicine Discharge Summary    Patient ID: Erika Kimball      Patient's PCP: No primary care provider on file. Admit Date: 8/6/2021     Discharge Date:    8 8 21    Admitting Physician: Erica Salazar MD     Discharge Physician: Noemi Johnson MD     Discharge Diagnoses: Active Hospital Problems    Diagnosis Date Noted    Alcohol withdrawal seizure with complication ([facility].) [U68.376, R56.9] 08/06/2021       The patient was seen and examined on day of discharge and this discharge summary is in conjunction with any daily progress note from day of discharge. Hospital Course:     23 y.o. male who presents to the ED due to alcohol withdrawal . Patient was in rehab for one month and then was discharged to a sober house . He states that he was removed from the sober house for an unknown reason. He states that he started to binge drink . He states that he drinks 2/5 whiskey daily . He states that he had 2 two unwitnessed seizures Today . He states that he has a history of alcoholic seizures . Rawleigh Billing He states that his last drink was 48H . He reports abdominal pain nausea vomiting fever chills and shortness of breath . He states that he had hallucinations 3 days ago but denies hallucinating now. He denies chest pain Patient is afebrile blood pressure 127/101 95 % on room air , RR=16 pulse =88 . ALT 47 , AST 39 Alk phos 96 Trop <6 bilirubin 1.0 UDS positive for THC Ethanol level is neg CT Head reveals no acute process Ct cervical spine no acute fracture     Resumed on trileptal lexapro  He is feeling remarkably well and is independent for ambulation  He is eating well.   He is wanting to get discharged and states that he is going to do his best to not drink again    Exam:     /75   Pulse 64   Temp 97.6 °F (36.4 °C) (Temporal)   Resp 18   Ht 5' 7\" (1.702 m)   Wt 126 lb 9.6 oz (57.4 kg)   SpO2 95%   BMI 19.83 kg/m²     General appearance: No apparent distress, appears stated age and cooperative. HEENT: Pupils equal, round, and reactive to light. Conjunctivae/corneas clear. Neck: Supple, with full range of motion. No jugular venous distention. Trachea midline. Respiratory:  Normal respiratory effort. Clear to auscultation, bilaterally without Rales/Wheezes/Rhonchi. Cardiovascular: Regular rate and rhythm with normal S1/S2 without murmurs, rubs or gallops. Abdomen: Soft, non-tender, non-distended with normal bowel sounds. Musculoskeletal: No clubbing, cyanosis or edema bilaterally. Full range of motion without deformity. Skin: Skin color, texture, turgor normal.  No rashes or lesions. Neurologic:  Neurovascularly intact without any focal sensory/motor deficits. Cranial nerves: II-XII intact, grossly non-focal.  Psychiatric: Alert and oriented, thought content appropriate, normal insight      Consults:     IP CONSULT TO SOCIAL WORK  IP CONSULT TO INTERNAL MEDICINE  IP CONSULT TO SOCIAL WORK    Significant Diagnostic Studies:   CT HEAD WO CONTRAST    Result Date: 8/6/2021  No acute intracranial abnormality. CT CERVICAL SPINE WO CONTRAST    Result Date: 8/6/2021  No acute abnormality of the cervical spine. Disposition:  stable for home    Discharge Instructions/Follow-up:  given    Code Status:  No Order    Activity: activity as tolerated    Diet: regular diet    Labs:  For convenience and continuity at follow-up the following most recent labs are provided:      CBC:    Lab Results   Component Value Date    WBC 6.5 08/08/2021    HGB 12.7 08/08/2021    HCT 35.4 08/08/2021     08/08/2021       Renal:    Lab Results   Component Value Date     08/08/2021    K 3.9 08/08/2021     08/08/2021    CO2 22 08/08/2021    BUN 12 08/08/2021    CREATININE 0.7 08/08/2021    CALCIUM 7.7 08/08/2021       Discharge Medications:     Current Discharge Medication List             Details   pantoprazole (PROTONIX) 40 MG tablet Take 1 tablet by mouth every morning (before breakfast)  Qty: 30 tablet, Refills: 3      Multiple Vitamin (MULTIVITAMIN) TABS tablet Take 1 tablet by mouth daily  Qty: 30 tablet, Refills: 0      thiamine mononitrate (THIAMINE) 100 MG tablet Take 1 tablet by mouth daily  Qty: 30 tablet, Refills: 0                Details   escitalopram (LEXAPRO) 10 MG tablet Take 10 mg by mouth daily      OXcarbazepine (TRILEPTAL) 300 MG tablet Take 300 mg by mouth 2 times daily             Time Spent on discharge is more than 30 minutes in the examination, evaluation, counseling and review of medications and discharge plan.       Signed:    Christine Pizano MD   8/8/2021

## 2021-08-08 NOTE — PROGRESS NOTES
Discharge instructions provided to patient. All pertinent information relayed and medications reviewed. Explained the importance of compliance of medications. Reminded to make follow up appointments. All questions answered. IV and heart monitor removed.

## 2021-08-08 NOTE — PLAN OF CARE
Problem: Pain:  Goal: Pain level will decrease  Description: Pain level will decrease  Outcome: Met This Shift  Goal: Control of acute pain  Description: Control of acute pain  Outcome: Met This Shift  Goal: Control of chronic pain  Description: Control of chronic pain  8/7/2021 1538 by Bhakti Sagastume RN  Outcome: Met This Shift     Problem: Falls - Risk of:  Goal: Will remain free from falls  Description: Will remain free from falls  8/8/2021 0057 by Joi Bojorquez RN  Outcome: Met This Shift  8/7/2021 1538 by Bhakti Sagastume RN  Outcome: Met This Shift  Goal: Absence of physical injury  Description: Absence of physical injury  8/8/2021 0057 by Joi Bojorquez RN  Outcome: Met This Shift  8/7/2021 1538 by Bhakti Sagastume RN  Outcome: Met This Shift

## 2021-08-08 NOTE — PROGRESS NOTES
PA here to pick patient up. Daughter Adarsh Middleton called and updated. All pertinent information given to the drivers. [facility] and updated and informed pt. Is on her way.

## 2021-08-11 ENCOUNTER — HOSPITAL ENCOUNTER (INPATIENT)
Age: 26
LOS: 1 days | Discharge: HOME | DRG: 897 | End: 2021-08-12
Attending: EMERGENCY MEDICINE | Admitting: STUDENT IN AN ORGANIZED HEALTH CARE EDUCATION/TRAINING PROGRAM
Payer: COMMERCIAL

## 2021-08-11 ENCOUNTER — APPOINTMENT (OUTPATIENT)
Dept: CT IMAGING | Age: 26
DRG: 897 | End: 2021-08-11
Payer: COMMERCIAL

## 2021-08-11 DIAGNOSIS — F10.931 DTS (DELIRIUM TREMENS) (HCC): Primary | ICD-10-CM

## 2021-08-11 PROBLEM — E87.20 LACTIC ACIDOSIS: Status: ACTIVE | Noted: 2021-08-11

## 2021-08-11 PROBLEM — F10.10 ETOH ABUSE: Status: ACTIVE | Noted: 2021-08-11

## 2021-08-11 LAB
ACETAMINOPHEN LEVEL: <5 MCG/ML (ref 10–30)
ALBUMIN SERPL-MCNC: 4.3 G/DL (ref 3.5–5.2)
ALP BLD-CCNC: 92 U/L (ref 40–129)
ALT SERPL-CCNC: 27 U/L (ref 0–40)
AMPHETAMINE SCREEN, URINE: NOT DETECTED
ANION GAP SERPL CALCULATED.3IONS-SCNC: 16 MMOL/L (ref 7–16)
AST SERPL-CCNC: 36 U/L (ref 0–39)
BACTERIA: ABNORMAL /HPF
BARBITURATE SCREEN URINE: POSITIVE
BASOPHILS ABSOLUTE: 0.03 E9/L (ref 0–0.2)
BASOPHILS RELATIVE PERCENT: 0.4 % (ref 0–2)
BENZODIAZEPINE SCREEN, URINE: NOT DETECTED
BILIRUB SERPL-MCNC: 0.6 MG/DL (ref 0–1.2)
BILIRUBIN URINE: NEGATIVE
BLOOD, URINE: ABNORMAL
BUN BLDV-MCNC: 15 MG/DL (ref 6–20)
CALCIUM SERPL-MCNC: 9.3 MG/DL (ref 8.6–10.2)
CANNABINOID SCREEN URINE: POSITIVE
CHLORIDE BLD-SCNC: 104 MMOL/L (ref 98–107)
CLARITY: CLEAR
CO2: 22 MMOL/L (ref 22–29)
COCAINE METABOLITE SCREEN URINE: NOT DETECTED
COLOR: YELLOW
CREAT SERPL-MCNC: 0.9 MG/DL (ref 0.7–1.2)
EKG ATRIAL RATE: 77 BPM
EKG P AXIS: 38 DEGREES
EKG P-R INTERVAL: 112 MS
EKG Q-T INTERVAL: 396 MS
EKG QRS DURATION: 98 MS
EKG QTC CALCULATION (BAZETT): 448 MS
EKG R AXIS: 62 DEGREES
EKG T AXIS: 43 DEGREES
EKG VENTRICULAR RATE: 77 BPM
EOSINOPHILS ABSOLUTE: 0.13 E9/L (ref 0.05–0.5)
EOSINOPHILS RELATIVE PERCENT: 1.7 % (ref 0–6)
ETHANOL: <10 MG/DL (ref 0–0.08)
FENTANYL SCREEN, URINE: NOT DETECTED
GFR AFRICAN AMERICAN: >60
GFR NON-AFRICAN AMERICAN: >60 ML/MIN/1.73
GLUCOSE BLD-MCNC: 99 MG/DL (ref 74–99)
GLUCOSE URINE: NEGATIVE MG/DL
HCT VFR BLD CALC: 35.6 % (ref 37–54)
HEMOGLOBIN: 13.1 G/DL (ref 12.5–16.5)
IMMATURE GRANULOCYTES #: 0.03 E9/L
IMMATURE GRANULOCYTES %: 0.4 % (ref 0–5)
KETONES, URINE: 15 MG/DL
LACTIC ACID: 2.4 MMOL/L (ref 0.5–2.2)
LEUKOCYTE ESTERASE, URINE: NEGATIVE
LIPASE: 14 U/L (ref 13–60)
LYMPHOCYTES ABSOLUTE: 2.63 E9/L (ref 1.5–4)
LYMPHOCYTES RELATIVE PERCENT: 34.7 % (ref 20–42)
Lab: ABNORMAL
MCH RBC QN AUTO: 32.5 PG (ref 26–35)
MCHC RBC AUTO-ENTMCNC: 36.8 % (ref 32–34.5)
MCV RBC AUTO: 88.3 FL (ref 80–99.9)
METHADONE SCREEN, URINE: NOT DETECTED
MONOCYTES ABSOLUTE: 0.48 E9/L (ref 0.1–0.95)
MONOCYTES RELATIVE PERCENT: 6.3 % (ref 2–12)
NEUTROPHILS ABSOLUTE: 4.29 E9/L (ref 1.8–7.3)
NEUTROPHILS RELATIVE PERCENT: 56.5 % (ref 43–80)
NITRITE, URINE: NEGATIVE
OPIATE SCREEN URINE: NOT DETECTED
OXYCODONE URINE: NOT DETECTED
PDW BLD-RTO: 11.9 FL (ref 11.5–15)
PH UA: 6.5 (ref 5–9)
PHENCYCLIDINE SCREEN URINE: NOT DETECTED
PLATELET # BLD: 164 E9/L (ref 130–450)
PMV BLD AUTO: 9.7 FL (ref 7–12)
POTASSIUM REFLEX MAGNESIUM: 3.8 MMOL/L (ref 3.5–5)
PROTEIN UA: NEGATIVE MG/DL
RBC # BLD: 4.03 E12/L (ref 3.8–5.8)
RBC UA: ABNORMAL /HPF (ref 0–2)
SALICYLATE, SERUM: <0.3 MG/DL (ref 0–30)
SODIUM BLD-SCNC: 142 MMOL/L (ref 132–146)
SPECIFIC GRAVITY UA: 1.02 (ref 1–1.03)
TOTAL PROTEIN: 6.4 G/DL (ref 6.4–8.3)
TRICYCLIC ANTIDEPRESSANTS SCREEN SERUM: NEGATIVE NG/ML
UROBILINOGEN, URINE: 0.2 E.U./DL
WBC # BLD: 7.6 E9/L (ref 4.5–11.5)
WBC UA: ABNORMAL /HPF (ref 0–5)

## 2021-08-11 PROCEDURE — 93005 ELECTROCARDIOGRAM TRACING: CPT | Performed by: EMERGENCY MEDICINE

## 2021-08-11 PROCEDURE — 93010 ELECTROCARDIOGRAM REPORT: CPT | Performed by: INTERNAL MEDICINE

## 2021-08-11 PROCEDURE — 6370000000 HC RX 637 (ALT 250 FOR IP): Performed by: STUDENT IN AN ORGANIZED HEALTH CARE EDUCATION/TRAINING PROGRAM

## 2021-08-11 PROCEDURE — 6360000002 HC RX W HCPCS: Performed by: EMERGENCY MEDICINE

## 2021-08-11 PROCEDURE — 99223 1ST HOSP IP/OBS HIGH 75: CPT | Performed by: INTERNAL MEDICINE

## 2021-08-11 PROCEDURE — 2000000000 HC ICU R&B

## 2021-08-11 PROCEDURE — 2580000003 HC RX 258: Performed by: EMERGENCY MEDICINE

## 2021-08-11 PROCEDURE — 83690 ASSAY OF LIPASE: CPT

## 2021-08-11 PROCEDURE — 82077 ASSAY SPEC XCP UR&BREATH IA: CPT

## 2021-08-11 PROCEDURE — 6370000000 HC RX 637 (ALT 250 FOR IP): Performed by: EMERGENCY MEDICINE

## 2021-08-11 PROCEDURE — 83605 ASSAY OF LACTIC ACID: CPT

## 2021-08-11 PROCEDURE — 6360000002 HC RX W HCPCS: Performed by: INTERNAL MEDICINE

## 2021-08-11 PROCEDURE — 80179 DRUG ASSAY SALICYLATE: CPT

## 2021-08-11 PROCEDURE — 96374 THER/PROPH/DIAG INJ IV PUSH: CPT

## 2021-08-11 PROCEDURE — 81001 URINALYSIS AUTO W/SCOPE: CPT

## 2021-08-11 PROCEDURE — 80143 DRUG ASSAY ACETAMINOPHEN: CPT

## 2021-08-11 PROCEDURE — 80053 COMPREHEN METABOLIC PANEL: CPT

## 2021-08-11 PROCEDURE — 2580000003 HC RX 258: Performed by: INTERNAL MEDICINE

## 2021-08-11 PROCEDURE — 85025 COMPLETE CBC W/AUTO DIFF WBC: CPT

## 2021-08-11 PROCEDURE — 99285 EMERGENCY DEPT VISIT HI MDM: CPT

## 2021-08-11 PROCEDURE — 80307 DRUG TEST PRSMV CHEM ANLYZR: CPT

## 2021-08-11 PROCEDURE — 96375 TX/PRO/DX INJ NEW DRUG ADDON: CPT

## 2021-08-11 PROCEDURE — 87081 CULTURE SCREEN ONLY: CPT

## 2021-08-11 PROCEDURE — 70450 CT HEAD/BRAIN W/O DYE: CPT

## 2021-08-11 PROCEDURE — 96361 HYDRATE IV INFUSION ADD-ON: CPT

## 2021-08-11 RX ORDER — LORAZEPAM 2 MG/ML
3 INJECTION INTRAMUSCULAR
Status: DISCONTINUED | OUTPATIENT
Start: 2021-08-11 | End: 2021-08-12 | Stop reason: HOSPADM

## 2021-08-11 RX ORDER — ONDANSETRON 4 MG/1
4 TABLET, ORALLY DISINTEGRATING ORAL EVERY 8 HOURS PRN
Status: DISCONTINUED | OUTPATIENT
Start: 2021-08-11 | End: 2021-08-12 | Stop reason: HOSPADM

## 2021-08-11 RX ORDER — NICOTINE 21 MG/24HR
1 PATCH, TRANSDERMAL 24 HOURS TRANSDERMAL DAILY
Status: DISCONTINUED | OUTPATIENT
Start: 2021-08-11 | End: 2021-08-12 | Stop reason: HOSPADM

## 2021-08-11 RX ORDER — ACETAMINOPHEN 325 MG/1
650 TABLET ORAL EVERY 6 HOURS PRN
Status: DISCONTINUED | OUTPATIENT
Start: 2021-08-11 | End: 2021-08-12 | Stop reason: HOSPADM

## 2021-08-11 RX ORDER — 0.9 % SODIUM CHLORIDE 0.9 %
1000 INTRAVENOUS SOLUTION INTRAVENOUS ONCE
Status: COMPLETED | OUTPATIENT
Start: 2021-08-11 | End: 2021-08-11

## 2021-08-11 RX ORDER — THIAMINE HYDROCHLORIDE 100 MG/ML
100 INJECTION, SOLUTION INTRAMUSCULAR; INTRAVENOUS DAILY
Status: DISCONTINUED | OUTPATIENT
Start: 2021-08-15 | End: 2021-08-11 | Stop reason: CLARIF

## 2021-08-11 RX ORDER — SODIUM CHLORIDE 9 MG/ML
25 INJECTION, SOLUTION INTRAVENOUS PRN
Status: DISCONTINUED | OUTPATIENT
Start: 2021-08-11 | End: 2021-08-12 | Stop reason: HOSPADM

## 2021-08-11 RX ORDER — THIAMINE HYDROCHLORIDE 100 MG/ML
100 INJECTION, SOLUTION INTRAMUSCULAR; INTRAVENOUS DAILY
Status: DISCONTINUED | OUTPATIENT
Start: 2021-08-11 | End: 2021-08-11

## 2021-08-11 RX ORDER — LORAZEPAM 1 MG/1
2 TABLET ORAL
Status: DISCONTINUED | OUTPATIENT
Start: 2021-08-11 | End: 2021-08-12 | Stop reason: HOSPADM

## 2021-08-11 RX ORDER — LORAZEPAM 1 MG/1
1 TABLET ORAL
Status: DISCONTINUED | OUTPATIENT
Start: 2021-08-11 | End: 2021-08-12 | Stop reason: HOSPADM

## 2021-08-11 RX ORDER — SODIUM CHLORIDE 0.9 % (FLUSH) 0.9 %
5-40 SYRINGE (ML) INJECTION PRN
Status: DISCONTINUED | OUTPATIENT
Start: 2021-08-11 | End: 2021-08-12 | Stop reason: HOSPADM

## 2021-08-11 RX ORDER — LORAZEPAM 1 MG/1
3 TABLET ORAL
Status: DISCONTINUED | OUTPATIENT
Start: 2021-08-11 | End: 2021-08-12 | Stop reason: HOSPADM

## 2021-08-11 RX ORDER — NICOTINE POLACRILEX 4 MG
15 LOZENGE BUCCAL PRN
Status: DISCONTINUED | OUTPATIENT
Start: 2021-08-11 | End: 2021-08-12 | Stop reason: HOSPADM

## 2021-08-11 RX ORDER — POLYETHYLENE GLYCOL 3350 17 G/17G
17 POWDER, FOR SOLUTION ORAL DAILY PRN
Status: DISCONTINUED | OUTPATIENT
Start: 2021-08-11 | End: 2021-08-12 | Stop reason: HOSPADM

## 2021-08-11 RX ORDER — ACETAMINOPHEN 650 MG/1
650 SUPPOSITORY RECTAL EVERY 6 HOURS PRN
Status: DISCONTINUED | OUTPATIENT
Start: 2021-08-11 | End: 2021-08-12 | Stop reason: HOSPADM

## 2021-08-11 RX ORDER — LORAZEPAM 2 MG/ML
2 INJECTION INTRAMUSCULAR
Status: DISCONTINUED | OUTPATIENT
Start: 2021-08-11 | End: 2021-08-12 | Stop reason: HOSPADM

## 2021-08-11 RX ORDER — PHENOBARBITAL SODIUM 65 MG/ML
130 INJECTION INTRAMUSCULAR ONCE
Status: COMPLETED | OUTPATIENT
Start: 2021-08-11 | End: 2021-08-11

## 2021-08-11 RX ORDER — LORAZEPAM 2 MG/ML
1 INJECTION INTRAMUSCULAR
Status: DISCONTINUED | OUTPATIENT
Start: 2021-08-11 | End: 2021-08-12 | Stop reason: HOSPADM

## 2021-08-11 RX ORDER — FOLIC ACID 5 MG/ML
1 INJECTION, SOLUTION INTRAMUSCULAR; INTRAVENOUS; SUBCUTANEOUS DAILY
Status: DISCONTINUED | OUTPATIENT
Start: 2021-08-12 | End: 2021-08-12 | Stop reason: HOSPADM

## 2021-08-11 RX ORDER — LORAZEPAM 2 MG/ML
4 INJECTION INTRAMUSCULAR
Status: DISCONTINUED | OUTPATIENT
Start: 2021-08-11 | End: 2021-08-12 | Stop reason: HOSPADM

## 2021-08-11 RX ORDER — THIAMINE HYDROCHLORIDE 100 MG/ML
100 INJECTION, SOLUTION INTRAMUSCULAR; INTRAVENOUS DAILY
Status: DISCONTINUED | OUTPATIENT
Start: 2021-08-15 | End: 2021-08-12 | Stop reason: HOSPADM

## 2021-08-11 RX ORDER — SODIUM CHLORIDE 0.9 % (FLUSH) 0.9 %
5-40 SYRINGE (ML) INJECTION EVERY 12 HOURS SCHEDULED
Status: DISCONTINUED | OUTPATIENT
Start: 2021-08-11 | End: 2021-08-12 | Stop reason: HOSPADM

## 2021-08-11 RX ORDER — ONDANSETRON 2 MG/ML
4 INJECTION INTRAMUSCULAR; INTRAVENOUS EVERY 6 HOURS PRN
Status: DISCONTINUED | OUTPATIENT
Start: 2021-08-11 | End: 2021-08-12 | Stop reason: HOSPADM

## 2021-08-11 RX ORDER — CHLORDIAZEPOXIDE HYDROCHLORIDE 25 MG/1
25 CAPSULE, GELATIN COATED ORAL EVERY 6 HOURS
Status: DISCONTINUED | OUTPATIENT
Start: 2021-08-11 | End: 2021-08-12

## 2021-08-11 RX ORDER — DEXTROSE MONOHYDRATE 50 MG/ML
100 INJECTION, SOLUTION INTRAVENOUS PRN
Status: DISCONTINUED | OUTPATIENT
Start: 2021-08-11 | End: 2021-08-12 | Stop reason: HOSPADM

## 2021-08-11 RX ORDER — LORAZEPAM 1 MG/1
4 TABLET ORAL
Status: DISCONTINUED | OUTPATIENT
Start: 2021-08-11 | End: 2021-08-12 | Stop reason: HOSPADM

## 2021-08-11 RX ORDER — DEXTROSE MONOHYDRATE 25 G/50ML
12.5 INJECTION, SOLUTION INTRAVENOUS PRN
Status: DISCONTINUED | OUTPATIENT
Start: 2021-08-11 | End: 2021-08-12 | Stop reason: HOSPADM

## 2021-08-11 RX ORDER — THIAMINE HYDROCHLORIDE 100 MG/ML
250 INJECTION, SOLUTION INTRAMUSCULAR; INTRAVENOUS DAILY
Status: DISCONTINUED | OUTPATIENT
Start: 2021-08-11 | End: 2021-08-11 | Stop reason: CLARIF

## 2021-08-11 RX ADMIN — SODIUM CHLORIDE, PRESERVATIVE FREE 10 ML: 5 INJECTION INTRAVENOUS at 22:19

## 2021-08-11 RX ADMIN — LORAZEPAM 1 MG: 1 TABLET ORAL at 14:02

## 2021-08-11 RX ADMIN — PHENOBARBITAL SODIUM 130 MG: 65 INJECTION INTRAMUSCULAR at 10:16

## 2021-08-11 RX ADMIN — THIAMINE HYDROCHLORIDE 100 MG: 100 INJECTION, SOLUTION INTRAMUSCULAR; INTRAVENOUS at 10:16

## 2021-08-11 RX ADMIN — ENOXAPARIN SODIUM 40 MG: 40 INJECTION SUBCUTANEOUS at 22:19

## 2021-08-11 RX ADMIN — LORAZEPAM 2 MG: 1 TABLET ORAL at 11:53

## 2021-08-11 RX ADMIN — SODIUM CHLORIDE, PRESERVATIVE FREE 10 ML: 5 INJECTION INTRAVENOUS at 22:20

## 2021-08-11 RX ADMIN — SODIUM CHLORIDE 1000 ML: 9 INJECTION, SOLUTION INTRAVENOUS at 08:00

## 2021-08-11 RX ADMIN — LORAZEPAM 2 MG: 1 TABLET ORAL at 22:18

## 2021-08-11 RX ADMIN — LORAZEPAM 1 MG: 1 TABLET ORAL at 15:13

## 2021-08-11 RX ADMIN — LORAZEPAM 3 MG: 2 INJECTION INTRAMUSCULAR; INTRAVENOUS at 07:54

## 2021-08-11 ASSESSMENT — PAIN SCALES - GENERAL
PAINLEVEL_OUTOF10: 0

## 2021-08-11 NOTE — H&P
[facility]  Internal Medicine Residency Program  History and Physical    Patient:  Miley Paulino 23 y.o. male MRN: [id number]     Date of Service: 8/11/2021    Hospital Day: 1      Chief complaint: had concerns including Delirium Tremens (DTS) ([address] from alcohol; last drink 2200 yesterday ; drinks one fifth whiskey daily) and Seizures (unwitnessed seizure today; states \"fell from top bunk onto floor; denies any complaints of pain). History of Present Illness   The patient is a 23 y.o. male with a past medical history of chronic alcohol abuse, delirium tremens and major depression presents to ED after having an unwitnessed seizure which made him fall from the top bunk onto the floor and landed on his back. He denies aura, hitting his head, nausea vomiting, tongue bite, bowel or bladder incontinence. He states being confused for couple of minutes after the seizure but admits to quickly recovering from it. He endorses some pain in left lower abdomen which has been there since he underwent laparoscopic appendectomy 1 month ago. He denies any headache, chest pain, shortness of breath, fever, chills, muscle weakness, numbness or tingling in hands or legs. He is chronic EtOH user and admits to drinking 1/2 of whiskey bottle every day. Last drink was at 10 PM yesterday. He is from [place] and was staying in [facility] from the last couple of months with his friend but was kicked out yesterday. He claims to be homeless now. He admits to smoking 5 cigarettes/day since age of 16 but denies drug abuse. He was recently admitted to the hospital with similar complaints and was discharged on 8/6/2021 on Trileptal and Lexapro. At that time he was in rehab for 1 month and was moved to a sober house but got removed and started drinking again which led to 2 seizure episodes. In the ED-/66, heart rate 68, spo2 97% on room air.   BMP was normal, lactic acid 2.4, Hb-13.1, WBC 7.6, platelets 805. Urine was positive for ketones and traces of blood with rare bacteria. Serum toxic screen negative, urine drug screen positive for barbiturates and cannabinoid. CT head-no abnormality  EKG suggestive of sinus rhythm    In ED he received 1000 mL saline bolus, phenobarbital 130 mg IV once and injection thiamine 100 mg daily  He is admitted to MICU for further management        Past Medical History:      Diagnosis Date    Alcohol abuse        Past Surgical History:        Procedure Laterality Date    APPENDECTOMY         Medications Prior to Admission:    Prior to Admission medications    Medication Sig Start Date End Date Taking? Authorizing Provider   escitalopram (LEXAPRO) 10 MG tablet Take 10 mg by mouth daily   Yes Historical Provider, MD   OXcarbazepine (TRILEPTAL) 300 MG tablet Take 300 mg by mouth 2 times daily   Yes Historical Provider, MD       Allergies:  Patient has no known allergies. Social History:   TOBACCO:   reports that he has been smoking. He has been smoking about 0.25 packs per day. He has never used smokeless tobacco.  ETOH:   reports current alcohol use. OCCUPATION: none     Family History:   History reviewed. No pertinent family history. REVIEW OF SYSTEMS:    · Constitutional: No fever, no chills, no change in weight; good appetite  · HEENT: No blurred vision, no ear problems, no sore throat, no rhinorrhea. · Respiratory: No cough, no sputum production, no pleuritic chest pain, no shortness of breath  · Cardiology: No angina, no dyspnea on exertion, no paroxysmal nocturnal dyspnea, no orthopnea, no palpitation, no leg swelling. · Gastroenterology: No dysphagia, no reflux; + LLQ abdominal pain, no nausea or vomiting; no constipation or diarrhea.  No hematochezia   · Genitourinary: No dysuria, no frequency, hesitancy; no hematuria  · Musculoskeletal: no joint pain, no myalgia, no change in range of movement  · Neurology: no focal weakness in extremities, no slurred speech, no double vision, no tingling or numbness sensation  · Endocrinology: no temperature intolerance, no polyphagia, polydipsia or polyuria  · Hematology: no increased bleeding, no bruising, no lymphadenopathy  · Skin: no skin changes noticed by patient  · Psychology: no depressed mood, no suicidal ideation    Physical Exam   · Vitals: /84   Pulse 85   Temp 98.2 °F (36.8 °C) (Tympanic)   Resp 15   Ht 5' 7\" (1.702 m)   Wt 130 lb (59 kg)   SpO2 98%   BMI 20.36 kg/m²     · General Appearance: alert and oriented to person, place and time, well-developed and well-nourished, in no acute distress  · Skin: warm and dry, no rash or erythema  · Head: normocephalic and atraumatic  · Eyes: pupils equal, round, and reactive to light, extraocular eye movements intact, conjunctivae normal  · Pulmonary/Chest: clear to auscultation bilaterally- no wheezes, rales or rhonchi, normal air movement, no respiratory distress  · Cardiovascular: normal rate, normal S1 and S2, no gallops, intact distal pulses and no carotid bruits  · Abdomen: mild tenderness in LLQ on deep palpation, skin wound of previous lap appendectomy healing well, no bleeding or redness, abdomen non-distended, bowel sounds normal, no masses, no organomegaly  · Extremities: no cyanosis, no clubbing and no edema  · Musculoskeletal: normal range of motion, no joint swelling, deformity or tenderness  · Neurologic: no cranial nerve deficit, gait and coordination normal, speech normal, sensation to light touch intact, muscle strength normal, tremor ++ in B/L hands  Labs and Imaging Studies   Basic Labs  Recent Labs     08/11/21  0755      K 3.8      CO2 22   BUN 15   CREATININE 0.9   GLUCOSE 99   CALCIUM 9.3       Recent Labs     08/11/21  0755   WBC 7.6   RBC 4.03   HGB 13.1   HCT 35.6*   MCV 88.3   MCH 32.5   MCHC 36.8*   RDW 11.9      MPV 9.7         Imaging Studies:  CT head w/o contrast- No acute intracranial abnormality    Resident's Assessment and Plan     Assessment and Plan:    1. Seizures 2/2 ETOH withdrawal  History of unknown unwitnessed seizure with fall  No postictal confusion now  Tremors in bilateral hands +  Serum drug screen negative for ethanol  CT scan-no intra cranial abnormality  Received 1 dose of phenobarbital 130 mg IV in ED  [facility] protocol  Injection thiamine IV daily  Watch for delirium tremens    2. Chronic ETOH use  Last drink at 10 PM yesterday  UDS positive for Barbiturates and cannabinoids  CIWA protocol  Injection thiamine  Observe for withdrawal symptoms    3. Tobacco use  Nicotine patch 14 mg per 24 hours      4.  Hx Major depression  Takes escitalopram 10 mg daily, oxcarbazepine 300 mg twice daily at home  Currently on hold          PT/OT evaluation: not indicated at this time  DVT prophylaxis/ GI prophylaxis: none   [facility]    Rodriguez Garcia MD, PGY-1  Attending physician: Dr. Tana Palm

## 2021-08-11 NOTE — PROGRESS NOTES
Received from ED at this time via bed. Alert to all stimuli. Oriented times four. No complaints of pain or SOB. No tremors noted. No nausea or vomiting noted   Oriented to the environment at this time. Bed in low position. Call light within reach. CIWA protocol maintained.

## 2021-08-11 NOTE — PLAN OF CARE
Problem: Discharge Planning:  Goal: Discharged to appropriate level of care  Description: Discharged to appropriate level of care  Outcome: Not Met This Shift     Problem: Fluid Volume - Deficit:  Goal: Absence of fluid volume deficit signs and symptoms  Description: Absence of fluid volume deficit signs and symptoms  Outcome: Met This Shift     Problem: Nutrition Deficit:  Goal: Ability to achieve adequate nutritional intake will improve  Description: Ability to achieve adequate nutritional intake will improve  Outcome: Met This Shift     Problem: Sleep Pattern Disturbance:  Goal: Appears well-rested  Description: Appears well-rested  Outcome: Met This Shift

## 2021-08-11 NOTE — CARE COORDINATION
Social Work [address] [place] [address] Planning:    Pt presents to the ED secondary to seizures and withdrawal from alcohol. Pt is from [place] but had been staying locally with a friend. SW met with pt who was initially sleeping but awakened to his name being called and answered questions appropriately. Pt reports he was staying with a friend in [facility] but got kicked out of the house so he is currently homeless. Pt reports hx of alcohol addiction and usually drinks a bottle of whiskey per day. Pt reports last drink was last night. Pt reports he has gone through detox on his own in the past and was not interested in any referrals to local detox or treatment facilities and not interested in speaking to anyone from [facility]. Pt reports he will be going to the [address] [place] [address] when he discharges. TRESSA/YOVANA to follow.

## 2021-08-11 NOTE — ED NOTES
Bed: 16  Expected date:   Expected time:   Means of arrival:   Comments:  triage     Melinda Marvin RN  08/11/21 7901

## 2021-08-12 VITALS
TEMPERATURE: 98.4 F | OXYGEN SATURATION: 95 % | BODY MASS INDEX: 20.56 KG/M2 | WEIGHT: 131 LBS | SYSTOLIC BLOOD PRESSURE: 115 MMHG | HEIGHT: 67 IN | DIASTOLIC BLOOD PRESSURE: 84 MMHG | RESPIRATION RATE: 18 BRPM | HEART RATE: 85 BPM

## 2021-08-12 LAB
ANION GAP SERPL CALCULATED.3IONS-SCNC: 17 MMOL/L (ref 7–16)
BUN BLDV-MCNC: 9 MG/DL (ref 6–20)
CALCIUM SERPL-MCNC: 8.7 MG/DL (ref 8.6–10.2)
CHLORIDE BLD-SCNC: 104 MMOL/L (ref 98–107)
CO2: 23 MMOL/L (ref 22–29)
CREAT SERPL-MCNC: 0.8 MG/DL (ref 0.7–1.2)
GFR AFRICAN AMERICAN: >60
GFR NON-AFRICAN AMERICAN: >60 ML/MIN/1.73
GLUCOSE BLD-MCNC: 86 MG/DL (ref 74–99)
LACTIC ACID: 2.3 MMOL/L (ref 0.5–2.2)
MAGNESIUM: 2.3 MG/DL (ref 1.6–2.6)
PHOSPHORUS: 4.2 MG/DL (ref 2.5–4.5)
POTASSIUM SERPL-SCNC: 3.6 MMOL/L (ref 3.5–5)
SODIUM BLD-SCNC: 144 MMOL/L (ref 132–146)

## 2021-08-12 PROCEDURE — 84100 ASSAY OF PHOSPHORUS: CPT

## 2021-08-12 PROCEDURE — 6370000000 HC RX 637 (ALT 250 FOR IP): Performed by: STUDENT IN AN ORGANIZED HEALTH CARE EDUCATION/TRAINING PROGRAM

## 2021-08-12 PROCEDURE — 6360000002 HC RX W HCPCS: Performed by: INTERNAL MEDICINE

## 2021-08-12 PROCEDURE — 99222 1ST HOSP IP/OBS MODERATE 55: CPT | Performed by: INTERNAL MEDICINE

## 2021-08-12 PROCEDURE — 80048 BASIC METABOLIC PNL TOTAL CA: CPT

## 2021-08-12 PROCEDURE — 83605 ASSAY OF LACTIC ACID: CPT

## 2021-08-12 PROCEDURE — 36415 COLL VENOUS BLD VENIPUNCTURE: CPT

## 2021-08-12 PROCEDURE — 83735 ASSAY OF MAGNESIUM: CPT

## 2021-08-12 RX ORDER — FOLIC ACID 1 MG/1
1 TABLET ORAL DAILY
Qty: 90 TABLET | Refills: 1 | Status: SHIPPED | OUTPATIENT
Start: 2021-08-12

## 2021-08-12 RX ORDER — ESCITALOPRAM OXALATE 10 MG/1
10 TABLET ORAL DAILY
Status: DISCONTINUED | OUTPATIENT
Start: 2021-08-12 | End: 2021-08-12 | Stop reason: HOSPADM

## 2021-08-12 RX ORDER — THIAMINE MONONITRATE (VIT B1) 100 MG
100 TABLET ORAL DAILY
Qty: 30 TABLET | Refills: 3 | Status: SHIPPED | OUTPATIENT
Start: 2021-08-12

## 2021-08-12 RX ADMIN — ENOXAPARIN SODIUM 40 MG: 40 INJECTION SUBCUTANEOUS at 08:30

## 2021-08-12 ASSESSMENT — PAIN SCALES - GENERAL
PAINLEVEL_OUTOF10: 0

## 2021-08-12 NOTE — PLAN OF CARE
0020 - Patient found out of bed. EKG leads were ripped off and RA PIV was out and bleeding. Patient stated he was going out to have a cigarette. Writer sat down with patient and told patient if he left that he would not be permitted back on the unit. Patient was persistent and spoke with the attending resident regarding leaving against medical advice. 56 - Writer continued to talk with patient regarding care. The patient came to a decision to stay at the hospital and continue to receive medical attention. MICU resident team notified of patient's decision. Patient was placed back on monitor but was refusing BP cuff and SpO2 monitor. Stating it hindered his sleeping. Agreed to intermittent checks through out the night. Garett Horne will continue to check on patient and assess patient per the hospital policy.

## 2021-08-12 NOTE — PROGRESS NOTES
Patient refusing IV placement and blood draw at this time, states he will be leaving in a few hours. Informed/educated on the importance of lab work and IV medications, will follow up at a later time.

## 2021-08-12 NOTE — DISCHARGE SUMMARY
[facility]  Discharge Summary    PCP: No primary care provider on file. Admit Date:8/11/2021  Discharge Date: 8/12/2021    Admission Diagnosis:   1. Seizures 2/2 alcohol withdrawal  2. Lactic acidosis  3. Polysubstance abuse (+natasha, + cannabinoid)  4. Alcohol abuse, with hx alcohol withdrawal seizures   5. Tobacco use  6. Depression    Discharge Diagnosis:  1. Seizures 2/2 alcohol withdrawal - resolved  2. Lactic acidosis - improving  3. Polysubstance abuse (+natasha, + cannabinoid)  4. Alcohol abuse, with hx of alcohol withdrawal seizures  5. Tobacco use  6. Depression    Hospital Course:   Patient is a 23 y.o. male with a past medical history of chronic alcohol abuse, delirium tremens and major depression presents to ED after having an unwitnessed seizure which made him fall from the top bunk onto the floor and landed on his back. He denies aura, hitting his head, nausea vomiting, tongue bite, bowel or bladder incontinence. He states being confused for couple of minutes after the seizure but admits to quickly recovering from it. He endorses some pain in left lower abdomen which has been there since he underwent laparoscopic appendectomy 1 month ago. He denies any headache, chest pain, shortness of breath, fever, chills, muscle weakness, numbness or tingling in hands or legs. He is chronic EtOH user and admits to drinking 1/2 of whiskey bottle every day. Last drink was at 10 PM yesterday. He is from [place] and was staying in [address] [place] [address] from the last couple of months with his friend but was kicked out yesterday. He claims to be homeless now. He admits to smoking 5 cigarettes/day since age of 16 but denies drug abuse.     He was recently admitted to the hospital with similar complaints and was discharged on 8/6/2021 on Trileptal and Lexapro.   At that time he was in rehab for 1 month and was moved to a sober house but got removed and started drinking again which led to 2 seizure episodes.      In the ED, /66, heart rate 68, spo2 97% on room air. BMP was normal, lactic acid 2.4, Hb-13.1, WBC 7.6, platelets 710. Urine was positive for ketones and traces of blood with rare bacteria. Serum toxic screen negative, urine drug screen positive for barbiturates and cannabinoid. CT head and EKG were unremarkable. He received 1000 mL saline bolus, phenobarbital 130 mg IV once and injection thiamine 100 mg daily. He is admitted to MICU for further management. Overnight, patient was stable. Patient was wanting to leave AMA, JESUS discussed the risk and answered patient's questions. After the patient talked to his family and could not get a ride, the patient decided to stay. Patient was refusing to put on BP cuff and SpO2 monitor, stating it hindered his sleeping but agreed to intermittent checks throughout the night. No new seizure episodes noted throughout hospital stay. Patient discharged today. Discussed discharge planning with the patient at bedside.  All the questions were addressed. Significant findings (history and exam, laboratory, radiological, pathology, other tests):   · General Appearance: alert, appears stated age, cooperative and no distress  · HEENT:  Head: Normocephalic, no lesions, without obvious abnormality. · Eye: Normal external eye, conjunctiva, lids cornea, RON. · Nose: Normal external nose, mucus membranes and septum. · Neck: no adenopathy, no carotid bruit, no JVD, supple, symmetrical, trachea midline and thyroid not enlarged, symmetric, no tenderness/mass/nodules  · Lung: clear to auscultation bilaterally  · Heart: regular rate and rhythm, S1, S2 normal, no murmur, click, rub or gallop  · Abdomen: soft, non-tender; bowel sounds normal; no masses,  no organomegaly  · Extremities:  extremities normal, atraumatic, no cyanosis or edema  · Musculokeletal: No joint swelling, no muscle tenderness. ROM normal in all joints of extremities.    · Neurologic: Mental status: Alert, oriented, thought content appropriate  · CT Head wo contrast (8/11/21) - No acute intracranial abnormality    Pending test results:   1. None    Consults:  1. Social Work    Procedures:  1. None     Condition at discharge: Stable    Disposition: home    Discharge Medications:  Current Discharge Medication List      START taking these medications    Details   folic acid (FOLVITE) 1 MG tablet Take 1 tablet by mouth daily  Qty: 90 tablet, Refills: 1         CONTINUE these medications which have CHANGED    Details   vitamin B-1 (THIAMINE) 100 MG tablet Take 1 tablet by mouth daily  Qty: 30 tablet, Refills: 3         CONTINUE these medications which have NOT CHANGED    Details   escitalopram (LEXAPRO) 10 MG tablet Take 10 mg by mouth daily      OXcarbazepine (TRILEPTAL) 300 MG tablet Take 300 mg by mouth 2 times daily         STOP taking these medications       pantoprazole (PROTONIX) 40 MG tablet Comments:   Reason for Stopping:         Multiple Vitamin (MULTIVITAMIN) TABS tablet Comments:   Reason for Stopping:             Activity as tolerated  Diet: common adult  Be compliant with your medications and take them as prescribed. Special Instructions:   · Avoid alcohol consumption  · Schedule appointment with your primary care doctor   · Continue to take folic acid and thiamine daily    Hospital Follow up: If you end up staying in [facility], please call [address] [place] [address] at [phone] to schedule an appointment.       Jose Luis Wolf MD  PGY-1   3:18 PM 8/12/2021

## 2021-08-12 NOTE — PROGRESS NOTES
[facility]  Internal Medicine Residency / [facility] Medicine Service    Attending Physician Statement, initial assessment  I have discussed the case, including pertinent history (I reviewed medical, surg, soc and fam histories) and exam findings with the resident and the team.  I have seen and examined the patient, reviewed meds and pertinent labs and the key elements of the encounter have been performed by me. I agree with the assessment, plan and orders as documented by the resident. Patient admitted due to \"DTs\". Has hx significant ETOH abuse (1/5 whiskey/day). Patient is a smoker as well. Had initially had unwitnessed seizure, noted to be diaphoretic and tremulous in ER and had slightly elevated lactic acid of 2.4. On exam this am patient was awake and alert, and was not diaphoretic, tachycardic, or in any distress. Denied any hallucinations. Initial ETOH level was < 10. [place] and is homeless. Will need to have  see him prior to discharge. Remainder of medical problems as per resident note.       Selinda Opitz, DO  Internal Medicine Residency Faculty

## 2021-08-12 NOTE — PROGRESS NOTES
[facility]  Internal Medicine Residency Program  Progress Note - House Team     Patient:  Tomer Dempsey 23 y.o. male MRN: [id number]     Date of Service: 8/12/2021     CC: unwitnessed seizure  Overnight events: No acute overnight events     Subjective     Patient was seen and examined this morning at bedside in no acute distress. Patient seemed to intentionally make tremors on his hands and complained of tenderness on the epigastric area. Overnight, patient was stable. Patient was wanting to leave AMA last night, JESUS discussed the risk and answered patient's questions. After the patient talked to his family and could not get a ride, the patient decided to stay. Patient was refusing to put on BP cuff and SpO2 monitor, stating it hindered his sleeping but agreed to intermittent checks throughout the night. Objective     Physical Exam:  · Vitals: /86   Pulse 85   Temp 98.4 °F (36.9 °C) (Axillary)   Resp 15   Ht 5' 7\" (1.702 m)   Wt 131 lb (59.4 kg)   SpO2 95%   BMI 20.52 kg/m²     · I & O - 24hr: I/O this shift:  · In: 20 [I.V.:20]  · Out: 750 [Urine:750]   · General Appearance: alert, appears stated age, cooperative and no distress  · HEENT:  Head: Normocephalic, no lesions, without obvious abnormality. · Eye: Normal external eye, conjunctiva, lids cornea, RON. · Nose: Normal external nose, mucus membranes and septum. · Neck: no adenopathy, no carotid bruit, no JVD, supple, symmetrical, trachea midline and thyroid not enlarged, symmetric, no tenderness/mass/nodules  · Lung: clear to auscultation bilaterally  · Heart: regular rate and rhythm, S1, S2 normal, no murmur, click, rub or gallop  · Abdomen: normal findings: bowel sounds normal and abnormal findings:  tenderness mild in the epigastrium  · Extremities:  extremities normal, atraumatic, no cyanosis or edema  · Musculokeletal: No joint swelling, no muscle tenderness. ROM normal in all joints of extremities. · Neurologic: Mental status: Alert, oriented, thought content appropriate  Subject  Pertinent Labs & Imaging Studies   sully  CBC with Differential:    Lab Results   Component Value Date    WBC 7.6 08/11/2021    RBC 4.03 08/11/2021    HGB 13.1 08/11/2021    HCT 35.6 08/11/2021     08/11/2021    MCV 88.3 08/11/2021    MCH 32.5 08/11/2021    MCHC 36.8 08/11/2021    RDW 11.9 08/11/2021    LYMPHOPCT 34.7 08/11/2021    MONOPCT 6.3 08/11/2021    BASOPCT 0.4 08/11/2021    MONOSABS 0.48 08/11/2021    LYMPHSABS 2.63 08/11/2021    EOSABS 0.13 08/11/2021    BASOSABS 0.03 08/11/2021     BMP:    Lab Results   Component Value Date     08/12/2021    K 3.6 08/12/2021    K 3.8 08/11/2021     08/12/2021    CO2 23 08/12/2021    BUN 9 08/12/2021    LABALBU 4.3 08/11/2021    CREATININE 0.8 08/12/2021    CALCIUM 8.7 08/12/2021    GFRAA >60 08/12/2021    LABGLOM >60 08/12/2021    GLUCOSE 86 08/12/2021     Hepatic Function Panel:    Lab Results   Component Value Date    ALKPHOS 92 08/11/2021    ALT 27 08/11/2021    AST 36 08/11/2021    PROT 6.4 08/11/2021    BILITOT 0.6 08/11/2021    LABALBU 4.3 08/11/2021     Urine Toxicology:  No components found for: IAMMENTA, IBARBIT, IBENZO, ICOCAINE, IMARTHC, IOPIATES, IPHENCYC    CT Head WO Contrast   Final Result   No acute intracranial abnormality. Resident's Assessment and Plan     Giovanna Del Valle is a 23year old male with a past medical history of chronic alcohol abuse, delirium tremens and major depression presents to ED after having an unwitnessed seizure. Currently being treated for the following:    Assessment and Plan:    1.  Seizures 2/2 alcohol withdrawal  · Concerns for delirium tremens  · Patient had an episode of unwitnessed seizure, has significant history of alcohol abuse (1/5 whiskey/day)  · Was tremulous in the ED but was hemodynamically stable, CT head & EKG unremarkable  · Was given 1 dose of phenobarbital and thiamine in ED  · No seizures since admission, currently asymptomatics: vitals stable, no tremors, no visual and auditory hallucinations, no diaphoresis  · On CIWA protocol    2. Lactic acidosis  · LA 2.4 on admission -> 2.3 today    3. Polysubstance abuse  · UDS (+) benzos and cannabinoids    4. History of alcohol abuse  · Has significant history of alcohol abuse (1/5 whiskey/day), been on rehab and sober house but was kicked out   · On CIWA protocol    5. Tobacco use  · On nicotine patch    6. History of major depression  · Takes escitalopram 10 mg daily, oxcarbazepine 300 mg twice daily at home  · Currently on hold    7. Currently homeless  · Patient is originally from [place], currently in [facility] staying with his friend but got kicked out. · Patient states that his grandfather from [place] will pick him up today, ETA unknown. · SW consulted. CM talked to patient this afternoon, however, patient did not respond as he was sleeping. Current plan is [facility]. · Patient does not have a PCP here but has one in [place].     PT/OT evaluation: not indicated at this time  DVT prophylaxis/ GI prophylaxis: levonox/on diet  Disposition: continue current care, start discharge planning    Jose Luis Wolf MD, PGY-1  Attending physician: Dr. Saroj Lockett

## 2021-08-12 NOTE — CARE COORDINATION
Transition of Care-Discharge plan is [facility], attempted follow up conversation regarding discharge plan-patient was sleeping very soundly, did not wake up when I initiated conversation or called his name. If patient has any further needs SW or CM can be of service for please reach me at number provided below.      Georges STUARTN, RN  [facility]   [phone]

## 2021-08-12 NOTE — CONSULTS
[facility]  Internal Medicine Residency Program  MICU Consult    Patient:  Missy Sheth 23 y.o. male MRN: [id number]     Date of Service: 8/11/2021    Hospital Day: 1      Chief complaint: unwitnessed seizure   History of Present Illness   The patient is a 23 y.o. male with a past medical history of chronic alcohol abuse, delirium tremens and major depression presents to ED after having an unwitnessed seizure which made him fall from the top bunk onto the floor and landed on his back. He denies aura, hitting his head, nausea vomiting, tongue bite, bowel or bladder incontinence. He states being confused for couple of minutes after the seizure but admits to quickly recovering from it. He endorses some pain in left lower abdomen which has been there since he underwent laparoscopic appendectomy 1 month ago. He denies any headache, chest pain, shortness of breath, fever, chills, muscle weakness, numbness or tingling in hands or legs. He is chronic EtOH user and admits to drinking 1/2 of whiskey bottle every day. Last drink was at 10 PM yesterday. He is from [place] and was staying in [facility] from the last couple of months with his friend but was kicked out yesterday. He claims to be homeless now. He admits to smoking 5 cigarettes/day since age of 16 but denies drug abuse.     He was recently admitted to the hospital with similar complaints and was discharged on 8/6/2021 on Trileptal and Lexapro. At that time he was in rehab for 1 month and was moved to a sober house but got removed and started drinking again which led to 2 seizure episodes.      In the ED-/66, heart rate 68, spo2 97% on room air. BMP was normal, lactic acid 2.4, Hb-13.1, WBC 7.6, platelets 927. Urine was positive for ketones and traces of blood with rare bacteria. Serum toxic screen negative, urine drug screen positive for barbiturates and cannabinoid.   CT head-no abnormality  EKG suggestive of sinus rhythm     In ED he received 1000 mL saline bolus, phenobarbital 130 mg IV once and injection thiamine 100 mg daily  He is admitted to MICU for further management  Past Medical History:      Diagnosis Date    Alcohol abuse        Past Surgical History:        Procedure Laterality Date    APPENDECTOMY         Medications Prior to Admission:    Prior to Admission medications    Medication Sig Start Date End Date Taking? Authorizing Provider   escitalopram (LEXAPRO) 10 MG tablet Take 10 mg by mouth daily   Yes Historical Provider, MD   OXcarbazepine (TRILEPTAL) 300 MG tablet Take 300 mg by mouth 2 times daily   Yes Historical Provider, MD       Allergies:  Patient has no known allergies. Social History:   TOBACCO:   reports that he has been smoking. He has been smoking about 0.25 packs per day. He has never used smokeless tobacco.  ETOH:   reports current alcohol use. OCCUPATION: Homeless    Family History:   History reviewed. No pertinent family history. REVIEW OF SYSTEMS:    · Constitutional: No fever, no chills, no change in weight; good appetite  · HEENT: No blurred vision, no ear problems, no sore throat, no rhinorrhea. · Respiratory: No cough, no sputum production, no pleuritic chest pain, no shortness of breath  · Cardiology: No angina, no dyspnea on exertion, no paroxysmal nocturnal dyspnea, no orthopnea, no palpitation, no leg swelling. · Gastroenterology: No dysphagia, no reflux; no abdominal pain, no nausea or vomiting; no constipation or diarrhea.  No hematochezia   · Genitourinary: No dysuria, no frequency, hesitancy; no hematuria  · Musculoskeletal: no joint pain, no myalgia, no change in range of movement  · Neurology: no focal weakness in extremities, no slurred speech, no double vision, no tingling or numbness sensation  · Endocrinology: no temperature intolerance, no polyphagia, polydipsia or polyuria  · Hematology: no increased bleeding, no bruising, no lymphadenopathy  · Skin: no skin changes noticed by patient  · Psychology: no depressed mood, no suicidal ideation    Physical Exam   · Vitals: /81   Pulse 68   Temp 98.3 °F (36.8 °C) (Oral)   Resp 15   Ht 5' 7\" (1.702 m)   Wt 130 lb (59 kg)   SpO2 98%   BMI 20.36 kg/m²   ·    ·      · General Appearance: alert and oriented to person, place and time, well developed and well- nourished, in no acute distress  · Skin: warm and dry, no rash or erythema  · Head: normocephalic and atraumatic  · Eyes: pupils equal, round, and reactive to light, extraocular eye movements intact, conjunctivae normal  · Neck: supple and non-tender without mass, no thyromegaly or thyroid nodules, no cervical lymphadenopathy  · Pulmonary/Chest: clear to auscultation bilaterally- no wheezes, rales or rhonchi, normal air movement, no respiratory distress  · Cardiovascular: normal rate, regular rhythm, normal S1 and S2, no murmurs, rubs, clicks, or gallops, distal pulses intact, no carotid bruits  · Abdomen: soft, non-tender, non-distended, normal bowel sounds, no masses or organomegaly  · Extremities: no cyanosis, clubbing or edema  · Musculoskeletal: normal range of motion, no joint swelling, deformity or tenderness  · Neurologic: reflexes normal and symmetric, no cranial nerve deficit, gait, coordination and speech normal     Lines     site day    Art line   None    TLC None    PICC None    Hemoaccess None      ABG:   No results found for: PHART, PH, [id number], PCO2, PO2ART, PO2, MSH6BON, HCO3, BEART, BE, THGBART, THB, DLT1VWB, E8LFWVFD, O2SAT  Labs and Imaging Studies   Basic Labs  CBC:   Lab Results   Component Value Date    WBC 7.6 08/11/2021    RBC 4.03 08/11/2021    HGB 13.1 08/11/2021    HCT 35.6 08/11/2021    MCV 88.3 08/11/2021    RDW 11.9 08/11/2021     08/11/2021     BMP:    Lab Results   Component Value Date     08/12/2021    K 3.6 08/12/2021    K 3.8 08/11/2021     08/12/2021    CO2 23 08/12/2021    BUN 9 08/12/2021       Imaging Studies:     CT Head WO Contrast    Result Date: 8/11/2021  EXAMINATION: CT OF THE HEAD WITHOUT CONTRAST  8/11/2021 8:37 am TECHNIQUE: CT of the head was performed without the administration of intravenous contrast. Dose modulation, iterative reconstruction, and/or weight based adjustment of the mA/kV was utilized to reduce the radiation dose to as low as reasonably achievable. COMPARISON: 08/06/2021 the HISTORY: ORDERING SYSTEM PROVIDED HISTORY: EtOH withdrawa with reports of DTs and seizures TECHNOLOGIST PROVIDED HISTORY: Reason for exam:->EtOH withdrawa with reports of DTs and seizures Has a \"code stroke\" or \"stroke alert\" been called? ->No Decision Support Exception - unselect if not a suspected or confirmed emergency medical condition->Emergency Medical Condition (MA) What reading provider will be dictating this exam?->CRC FINDINGS: BRAIN/VENTRICLES: There is no acute intracranial hemorrhage, mass effect or midline shift. No abnormal extra-axial fluid collection. The gray-white differentiation is maintained without evidence of an acute infarct. There is no evidence of hydrocephalus. ORBITS: The visualized portion of the orbits demonstrate no acute abnormality. SINUSES: The visualized paranasal sinuses and mastoid air cells demonstrate no acute abnormality. There is bilateral ethmoid and left maxillary sinus mucosal thickening. Mastoids are aerated. SOFT TISSUES/SKULL:  No acute abnormality of the visualized skull or soft tissues. No acute intracranial abnormality. CT HEAD WO CONTRAST    Result Date: 8/6/2021  EXAMINATION: CT OF THE HEAD WITHOUT CONTRAST  8/6/2021 7:36 pm TECHNIQUE: CT of the head was performed without the administration of intravenous contrast. Dose modulation, iterative reconstruction, and/or weight based adjustment of the mA/kV was utilized to reduce the radiation dose to as low as reasonably achievable. COMPARISON: None.  HISTORY: ORDERING SYSTEM PROVIDED HISTORY: Trauma, seizure second to alcohol withdrawal TECHNOLOGIST PROVIDED HISTORY: Has a \"code stroke\" or \"stroke alert\" been called? ->No Reason for exam:->Trauma, seizure second to alcohol withdrawal Decision Support Exception - unselect if not a suspected or confirmed emergency medical condition->Emergency Medical Condition (MA) What reading provider will be dictating this exam?->CRC FINDINGS: BRAIN/VENTRICLES: There is no acute intracranial hemorrhage, mass effect or midline shift. No abnormal extra-axial fluid collection. The gray-white differentiation is maintained without evidence of an acute infarct. There is no evidence of hydrocephalus. ORBITS: The visualized portion of the orbits demonstrate no acute abnormality. SINUSES: The visualized paranasal sinuses and mastoid air cells demonstrate no acute abnormality. SOFT TISSUES/SKULL:  No acute abnormality of the visualized skull or soft tissues. No acute intracranial abnormality. CT CERVICAL SPINE WO CONTRAST    Result Date: 8/6/2021  EXAMINATION: CT OF THE CERVICAL SPINE WITHOUT CONTRAST 8/6/2021 6:36 pm TECHNIQUE: CT of the cervical spine was performed without the administration of intravenous contrast. Multiplanar reformatted images are provided for review. Dose modulation, iterative reconstruction, and/or weight based adjustment of the mA/kV was utilized to reduce the radiation dose to as low as reasonably achievable. COMPARISON: None. HISTORY: ORDERING SYSTEM PROVIDED HISTORY: Fall, seizure second alcohol withdrawal TECHNOLOGIST PROVIDED HISTORY: Reason for exam:->Fall, seizure second alcohol withdrawal Decision Support Exception - unselect if not a suspected or confirmed emergency medical condition->Emergency Medical Condition (MA) What reading provider will be dictating this exam?->CRC FINDINGS: BONES/ALIGNMENT: There is no acute fracture or traumatic malalignment. DEGENERATIVE CHANGES: No significant degenerative changes.  SOFT TISSUES: There is no prevertebral soft tissue swelling. No acute abnormality of the cervical spine. Resident's Assessment and Plan     Deborah Brian is a 23 y.o. male with past medical history of polysubstance abuse, currently being managed for:    1. Concern for alcohol withdrawal seizures   a. CT head & EKG negative   b. No hallucinations, diaphoresis, VSS  c. Home medications: trileptal, lexapro  2. Lactic acidosis 2.4  3. Polysubstance abuse (+natasha, + cannabinoid)  4. Alcohol abuse, with hx alcohol withdrawal seizures   5. Depression, on lexapro 10 mg OD and Trileptal 300 mg BID  6. Homeless > going to [facility] on d/c    Plan:  · Continue CIWA protocol and monitor for any withdrawals. · Monitor for any DTs, patient stable at this time. · Holding off on Librium due to national shortage and not in stock. · Will reassess in the morning for further management. · Continue thiamine 250 mg IV x3 days, followed by 100 mg daily. · Nicotine patch for tobacco abuse. · Seizure precautions. · Resume home Lexapro dose. PT/OT evaluation: Not indicated at this time  DVT prophylaxis/ GI prophylaxis: Lovenox/-  Disposition: Continue current management    Mercedes Rich MD, PGY-2  Attending physician: Dr. Escamilla Can personally saw, examined and provided care for the patient. Radiographs, labs and medication list were reviewed by me independently. I spoke with bedside nursing, therapists and consultants. Critical care services and times documented are independent of procedures and multidisciplinary rounds with Residents. Additionally comprehensive, multidisciplinary rounds were conducted with the MICU team. The case was discussed in detail and plans for care were established. Review of Residents documentation was conducted and revisions were made as appropriate. I agree with the above documented exam, problem list and plan of care.     Seen in am 8/12  Doing well  No signs of withdrawal  SIWA  Primary and psych if needed will follow  Amari Johnson MD,[facility]  Pulmonary&Critical Care Medicine   Director of [facility]  Medical Director of [address] [place] [address]    Ke Melchor

## 2021-08-12 NOTE — ED PROVIDER NOTES
EMILIANA Sheth is a 23 y.o. male with a PMHx significant for alcohol abuse who presents with symptoms of alcohol withdrawal. The patient's complaint is persistent, severe in severity and worsened by abrupt cessation of EtOH use. On arrival, the patient is unable to provide any substantial history due to his presenting symptoms, but he says he does have a history of DTs and that he had a withdrawal seizure approximately one hour ago. He's a daily drinker of approximately 5 years' time and thinks he takes in a fifth of whiskey per day; his last drink was last night. He endorses active hallucinations. The patient is currently taking no blood thinners. Tobacco Hx:   reports that he has been smoking. He has been smoking about 0.25 packs per day. He has never used smokeless tobacco.    Alcohol Hx:   reports current alcohol use. Illicit Drug Hx:  Reports no hx of illicit drug use. The history is provided by the patient due to his presenting condition. Last Tetanus (if applicable): n/a    Review of Systems   Unable to perform ROS: Acuity of condition        Physical Exam  Vitals and nursing note reviewed. Constitutional:       General: He is awake. He is in acute distress (2/2 to DTs). Appearance: He is ill-appearing and diaphoretic. HENT:      Head: Normocephalic and atraumatic. Comments: No abnormal step offs, hematomas or wounds. Right Ear: External ear normal.      Left Ear: External ear normal.      Nose: Nose normal. No congestion or rhinorrhea. Mouth/Throat:      Mouth: Mucous membranes are moist.      Pharynx: Oropharynx is clear. No posterior oropharyngeal erythema. Eyes:      General: No scleral icterus. Right eye: No discharge. Left eye: No discharge. Extraocular Movements: Extraocular movements intact. Conjunctiva/sclera: Conjunctivae normal.      Pupils: Pupils are equal, round, and reactive to light.    Cardiovascular:      Rate and Rhythm: Normal rate and regular rhythm. Heart sounds: Normal heart sounds. No murmur heard. No friction rub. No gallop. Comments: Upper extremity and lower extremity distal pulses intact bilaterally +2/4  Pulmonary:      Effort: Pulmonary effort is normal. No respiratory distress. Breath sounds: Normal breath sounds. No wheezing, rhonchi or rales. Comments: Good air movement noted throughout. Abdominal:      General: Bowel sounds are normal. There is no distension. Palpations: Abdomen is soft. Tenderness: There is no abdominal tenderness. There is no guarding or rebound. Musculoskeletal:         General: No tenderness or deformity. Normal range of motion. Cervical back: Normal range of motion and neck supple. No rigidity. No muscular tenderness. Right lower leg: No edema. Left lower leg: No edema. Comments: No signs of trauma. Lymphadenopathy:      Cervical: No cervical adenopathy. Skin:     General: Skin is warm. Capillary Refill: Capillary refill takes less than 2 seconds. Findings: No erythema or rash. Neurological:      General: No focal deficit present. Mental Status: He is alert and oriented to person, place, and time. GCS: GCS eye subscore is 4. GCS verbal subscore is 5. GCS motor subscore is 6. Cranial Nerves: No dysarthria or facial asymmetry. Sensory: Sensation is intact. No sensory deficit. Motor: Tremor (tremulous in the b/l UEs) present. No weakness or abnormal muscle tone. Comments: Comprehensive neuro exam was difficulty to complete due to the patient's presenting condition, but he was grossly neurologically intact. Psychiatric:         Attention and Perception: He perceives visual hallucinations. He does not perceive auditory hallucinations. Mood and Affect: Mood normal. Affect is flat. Speech: Speech is delayed. Behavior: Behavior is cooperative.          Thought Content: MPV 9.7 7.0 - 12.0 fL    Neutrophils % 56.5 43.0 - 80.0 %    Immature Granulocytes % 0.4 0.0 - 5.0 %    Lymphocytes % 34.7 20.0 - 42.0 %    Monocytes % 6.3 2.0 - 12.0 %    Eosinophils % 1.7 0.0 - 6.0 %    Basophils % 0.4 0.0 - 2.0 %    Neutrophils Absolute 4.29 1.80 - 7.30 E9/L    Immature Granulocytes # 0.03 E9/L    Lymphocytes Absolute 2.63 1.50 - 4.00 E9/L    Monocytes Absolute 0.48 0.10 - 0.95 E9/L    Eosinophils Absolute 0.13 0.05 - 0.50 E9/L    Basophils Absolute 0.03 0.00 - 0.20 E9/L   Comprehensive Metabolic Panel w/ Reflex to MG   Result Value Ref Range    Sodium 142 132 - 146 mmol/L    Potassium reflex Magnesium 3.8 3.5 - 5.0 mmol/L    Chloride 104 98 - 107 mmol/L    CO2 22 22 - 29 mmol/L    Anion Gap 16 7 - 16 mmol/L    Glucose 99 74 - 99 mg/dL    BUN 15 6 - 20 mg/dL    CREATININE 0.9 0.7 - 1.2 mg/dL    GFR Non-African American >60 >=60 mL/min/1.73    GFR African American >60     Calcium 9.3 8.6 - 10.2 mg/dL    Total Protein 6.4 6.4 - 8.3 g/dL    Albumin 4.3 3.5 - 5.2 g/dL    Total Bilirubin 0.6 0.0 - 1.2 mg/dL    Alkaline Phosphatase 92 40 - 129 U/L    ALT 27 0 - 40 U/L    AST 36 0 - 39 U/L   Lipase   Result Value Ref Range    Lipase 14 13 - 60 U/L   Lactic Acid, Plasma   Result Value Ref Range    Lactic Acid 2.4 (H) 0.5 - 2.2 mmol/L   Urinalysis, reflex to microscopic   Result Value Ref Range    Color, UA Yellow Straw/Yellow    Clarity, UA Clear Clear    Glucose, Ur Negative Negative mg/dL    Bilirubin Urine Negative Negative    Ketones, Urine 15 (A) Negative mg/dL    Specific Gravity, UA 1.025 1.005 - 1.030    Blood, Urine TRACE-INTACT Negative    pH, UA 6.5 5.0 - 9.0    Protein, UA Negative Negative mg/dL    Urobilinogen, Urine 0.2 <2.0 E.U./dL    Nitrite, Urine Negative Negative    Leukocyte Esterase, Urine Negative Negative   URINE DRUG SCREEN   Result Value Ref Range    Amphetamine Screen, Urine NOT DETECTED Negative <1000 ng/mL    Barbiturate Screen, Ur POSITIVE (A) Negative < 200 ng/mL Benzodiazepine Screen, Urine NOT DETECTED Negative < 200 ng/mL    Cannabinoid Scrn, Ur POSITIVE (A) Negative < 50ng/mL    Cocaine Metabolite Screen, Urine NOT DETECTED Negative < 300 ng/mL    Opiate Scrn, Ur NOT DETECTED Negative < 300ng/mL    PCP Screen, Urine NOT DETECTED Negative < 25 ng/mL    Methadone Screen, Urine NOT DETECTED Negative <300 ng/mL    Oxycodone Urine NOT DETECTED Negative <100 ng/mL    FENTANYL SCREEN, URINE NOT DETECTED Negative <1 ng/mL    Drug Screen Comment: see below    Serum Drug Screen   Result Value Ref Range    Ethanol Lvl <10 mg/dL    Acetaminophen Level <5.0 (L) 10.0 - 37.8 mcg/mL    Salicylate, Serum <0.9 0.0 - 30.0 mg/dL    TCA Scrn NEGATIVE Cutoff:300 ng/mL   Microscopic Urinalysis   Result Value Ref Range    WBC, UA NONE 0 - 5 /HPF    RBC, UA 1-3 0 - 2 /HPF    Bacteria, UA RARE (A) None Seen /HPF   EKG 12 Lead   Result Value Ref Range    Ventricular Rate 77 BPM    Atrial Rate 77 BPM    P-R Interval 112 ms    QRS Duration 98 ms    Q-T Interval 396 ms    QTc Calculation (Bazett) 448 ms    P Axis 38 degrees    R Axis 62 degrees    T Axis 43 degrees       RADIOLOGY: Interpreted by Radiologist unless otherwise noted. CT Head WO Contrast   Final Result   No acute intracranial abnormality. EKG: As interpreted by this ER physician.   Rate: 77 bpm  Rhythm: Sinus  Axis: normal  ST Segments: no acute change  T-Waves: no acute change  Interpretation: Sinus with sinus arrhythmia  Comparison: changes compared to previous EKG    Oxygen Saturation Interpretation: Normal    Meds Given:  Medications   sodium chloride flush 0.9 % injection 5-40 mL (10 mLs Intravenous Given 8/11/21 2220)   sodium chloride flush 0.9 % injection 5-40 mL (has no administration in time range)   0.9 % sodium chloride infusion (has no administration in time range)   LORazepam (ATIVAN) tablet 1 mg ( Oral See Alternative 8/11/21 2218)     Or   LORazepam (ATIVAN) injection 1 mg ( Intravenous See Alternative 8/11/21 2218)     Or   LORazepam (ATIVAN) tablet 2 mg (2 mg Oral Given 8/11/21 2218)     Or   LORazepam (ATIVAN) injection 2 mg ( Intravenous See Alternative 8/11/21 2218)     Or   LORazepam (ATIVAN) tablet 3 mg ( Oral See Alternative 8/11/21 2218)     Or   LORazepam (ATIVAN) injection 3 mg ( Intravenous See Alternative 8/11/21 2218)     Or   LORazepam (ATIVAN) tablet 4 mg ( Oral See Alternative 8/11/21 2218)     Or   LORazepam (ATIVAN) injection 4 mg ( Intravenous See Alternative 8/11/21 2218)   nicotine (NICODERM CQ) 14 MG/24HR 1 patch (1 patch Transdermal Patch Applied 8/11/21 2238)   sodium chloride flush 0.9 % injection 5-40 mL (10 mLs Intravenous Given 8/11/21 2219)   sodium chloride flush 0.9 % injection 5-40 mL (has no administration in time range)   0.9 % sodium chloride infusion (has no administration in time range)   enoxaparin (LOVENOX) injection 40 mg (40 mg Subcutaneous Given 8/11/21 2219)   ondansetron (ZOFRAN-ODT) disintegrating tablet 4 mg (has no administration in time range)     Or   ondansetron (ZOFRAN) injection 4 mg (has no administration in time range)   polyethylene glycol (GLYCOLAX) packet 17 g (has no administration in time range)   acetaminophen (TYLENOL) tablet 650 mg (has no administration in time range)     Or   acetaminophen (TYLENOL) suppository 650 mg (has no administration in time range)   chlordiazePOXIDE (LIBRIUM) capsule 25 mg (25 mg Oral Not Given 7/99/45 7428)   folic acid injection 1 mg (has no administration in time range)   glucose (GLUTOSE) 40 % oral gel 15 g (has no administration in time range)   dextrose 50 % IV solution (has no administration in time range)   glucagon (rDNA) injection 1 mg (has no administration in time range)   dextrose 5 % solution (has no administration in time range)   thiamine (B-1) 250 mg in sodium chloride 0.9 % 100 mL IVPB (has no administration in time range)     Followed by   thiamine (B-1) injection 100 mg (has no administration in time range)   0.9 % sodium chloride bolus (0 mLs Intravenous Stopped 8/11/21 1013)   PHENobarbital (LUMINAL) injection 130 mg (130 mg Intravenous Given 8/11/21 1016)       Procedures:  No procedures performed. --------------------------------- PROGRESS NOTES / ADDITIONAL PROVIDER NOTES ---------------------------------  Consultations:  As noted below. ED Course:    ED Course as of Aug 11 2347   Wed Aug 11, 2021   1241 I discussed the case with internal medicine. They agreed to admit the patient for further evaluation and management. Call placed to critical care to discuss placement in the ICU due to the patient's ongoing DTs. [ML]   0183 Case was discussed with Dr. Daisha Linton of critical care. He agrees to admit the patient for further evaluation and management. [ML]      ED Course User Index  [ML] Crescent Blades, DO       1248: All results were discussed with the patient and I have provided specific details regarding the plan for admission. This patient has remained hemodynamically stable and been closely monitored during his ED course. The patient was seen in the emergency department by myself and the assigned attending physician, Dr. Perry Goldstein, who agreed with the assessment, plan and decision to admit as laid out herein. The patient verbalized an understanding and agreement with the plan for admission. All questions were answered and the patient was deemed to be in stable condition without objective evidence of hemodynamic instability at the time of transport. Please note that the withdrawal or failure to initiate urgent interventions for this patient would likely have resulted in a life threatening deterioration or permanent disability. MDM:  Patient presented from home in acute alcohol withdrawal with reports of a seizure just PTA. On arrival in the ER, all VS were wnl. EKG and physical exam were as documented above. A work up was initiated to further evaluate his presenting complaints.  Labs were remarkable for a mildly elevated lactate which was suggestive of recent seizure. CT of the head did not reveal any acute intracranial pathology. The patient was actively hallucinating and with significant tremors of the bilateral UEs on presentation and was subsequently placed in CIWA protocol and treated with ativan and phenobarb. The case was discussed with IM and CC who agreed to admit the patient to the ICU for further evaluation and management. This patient's ED course included: a personal history and physicial examination, re-evaluation prior to disposition, multiple bedside re-evaluations, IV medications, cardiac monitoring, continuous pulse oximetry and complex medical decision making and emergency management      Accordingly this patient received 34 minutes of critical care time, excluding separately billable procedures. Clinical Impression  No diagnosis found. Disposition  Patient's disposition: Admit to CCU/ICU  Patient's condition is stable. This patient was seen, examined and treated with Dr. Kira Khanna. All pertinent aspects of the patient's care were discussed with the attending physician.        Ashley Figueroa DO  Resident  08/12/21 0296

## 2021-08-12 NOTE — PLAN OF CARE
Problem: Fluid Volume - Deficit:  Goal: Absence of fluid volume deficit signs and symptoms  Description: Absence of fluid volume deficit signs and symptoms  8/12/2021 0717 by Salma Briseno RN  Outcome: Met This Shift  8/11/2021 2334 by Deirdre Valle RN  Outcome: Met This Shift  8/11/2021 1838 by Arielle De Dios RN  Outcome: Met This Shift     Problem: Nutrition Deficit:  Goal: Ability to achieve adequate nutritional intake will improve  Description: Ability to achieve adequate nutritional intake will improve  8/12/2021 0717 by Salma Briseno RN  Outcome: Met This Shift  8/11/2021 2334 by Deirdre Valle RN  Outcome: Ongoing  8/11/2021 1838 by Arielle De Dios RN  Outcome: Met This Shift     Problem: Sleep Pattern Disturbance:  Goal: Appears well-rested  Description: Appears well-rested  8/12/2021 0717 by Salma Briseno RN  Outcome: Met This Shift  8/11/2021 2334 by Deirdre Valle RN  Outcome: Met This Shift  8/11/2021 1838 by Arielle De Dios RN  Outcome: Met This Shift

## 2021-08-12 NOTE — PROGRESS NOTES
[address]   Department of Internal Medicine   Internal Medicine Residency   MICU Progress Note    Patient:  Salome Bloom 23 y.o. male  MRN: [id number]     Date of Service: 2021    Allergy: Patient has no known allergies. Cc follow alcohol withdrawal  Subjective     Examined in AM. Pt alert,oriented x3. Waiting on transportation from family to go back to [place]. No symptoms or complaints. Objective     VITAL SIGNS:  /84   Pulse 85   Temp 98.4 °F (36.9 °C)   Resp 18   Ht 5' 7\" (1.702 m)   Wt 131 lb (59.4 kg)   SpO2 95%   BMI 20.52 kg/m²   Tmax over 24 hours:  Temp (24hrs), Av.4 °F (36.9 °C), Min:98.2 °F (36.8 °C), Max:98.5 °F (36.9 °C)      Patient Vitals for the past 6 hrs:   Temp Pulse Resp SpO2   21 1600 98.4 °F (36.9 °C) 85 18 95 %   21 1500 -- 72 18 --   21 1400 -- 81 17 95 %   21 1300 -- 82 17 --         Intake/Output Summary (Last 24 hours) at 2021 1853  Last data filed at 2021 1500  Gross per 24 hour   Intake 720 ml   Output 1800 ml   Net -1080 ml     Wt Readings from Last 2 Encounters:   21 131 lb (59.4 kg)   21 126 lb 9.6 oz (57.4 kg)     Body mass index is 20.52 kg/m². I & O - 24hr:     Intake/Output Summary (Last 24 hours) at 2021 1853  Last data filed at 2021 1500  Gross per 24 hour   Intake 720 ml   Output 1800 ml   Net -1080 ml       Physical Exam:  General Appearance: alert, appears stated age and cooperative  Neck: no adenopathy, no carotid bruit, no JVD, supple, symmetrical, trachea midline and thyroid not enlarged, symmetric, no tenderness/mass/nodules  Lung: clear to auscultation bilaterally  Heart: regular rate and rhythm, S1, S2 normal, no murmur, click, rub or gallop  Abdomen: soft, non-tender; bowel sounds normal; no masses,  no organomegaly  Extremities:  extremities normal, atraumatic, no cyanosis or edema  Musculoskeletal: No joint swelling, no muscle tenderness.  ROM normal in all joints of extremities. Neurologic: Mental status: Alert, oriented, thought content appropriate    Lines     site day    Art line   None    TLC None    PICC None    Hemoaccess None      VENT SETTINGS (Comprehensive) (if applicable):  Vent Information  SpO2: 95 %  Additional Respiratory  Assessments  Pulse: 85  Resp: 18  SpO2: 95 %    ABGs:   No results for input(s): PH, PCO2, PO2, HCO3, BE, O2SAT in the last 72 hours. Laboratory findings:  Complete Blood Count:   Recent Labs     08/11/21 0755   WBC 7.6   HGB 13.1   HCT 35.6*           Last 3 Blood Glucose:   Recent Labs     08/11/21 0755 08/12/21 0430   GLUCOSE 99 86        PT/INR:  No results found for: PROTIME, INR  PTT:  No results found for: APTT, PTT    Comprehensive Metabolic Profile:   Recent Labs     08/11/21 0755 08/12/21 0430    144   K 3.8 3.6    104   CO2 22 23   BUN 15 9   CREATININE 0.9 0.8   GLUCOSE 99 86   CALCIUM 9.3 8.7   PROT 6.4  --    LABALBU 4.3  --    BILITOT 0.6  --    ALKPHOS 92  --    AST 36  --    ALT 27  --       Magnesium:   Lab Results   Component Value Date    MG 2.3 08/12/2021     Phosphorus:   Lab Results   Component Value Date    PHOS 4.2 08/12/2021     Ionized Calcium: No results found for: CAION   Troponin: No results for input(s): TROPONINI in the last 72 hours. Urinalysis: Urine dipstick shows negative for all components. Micro exam: negative for WBC's or RBC's. Medications     Infusions: (Fluid, Sedation, Vasopressors)        ATB:   Antibiotics  Days   -            Cultures: -        Imaging     CT Head WO Contrast    Result Date: 8/11/2021  No acute intracranial abnormality. CT HEAD WO CONTRAST    Result Date: 8/6/2021  No acute intracranial abnormality. CT CERVICAL SPINE WO CONTRAST    Result Date: 8/6/2021  No acute abnormality of the cervical spine.         Resident's Assessment and Plan     Salome Bloom   , 23 y.o. , male came with CC : seizure      has a past medical history of Alcohol abuse. Assessment:  Concern for alcohol withdrawal seizures   CT head & EKG negative   No hallucinations, diaphoresis, VSS  Home medications: trileptal, lexapro  Lactic acidosis 2.4  Polysubstance abuse (+natasha, + cannabinoid)  Alcohol abuse, with hx alcohol withdrawal seizures   Depression, on lexapro 10 mg OD and Trileptal 300 mg BID  Homeless > going to [facility] on d/c      Plan:  Less concern for withdrawals, patient stable  Resumed home meds ( he ran out and hence went back to drinking alcohol)  Hold off librium  Nicotine patch for tobacco abuse  SW consult for social issues  Transfer out of icu and continue mgmt as per primary team     PT/OT: -  DVT ppx: Lovenox  GI ppx: -      Next of Kin/ POA:  Delmar Costa Parent       Address: Work phone:      Mobile phone: [phone]         Code Status:   Full    Disposition:Stable, transfer to telemetry unit    Bridget Day MD, PGY-2    Attending physician: Dr. Nicholas Perez personally saw, examined and provided care for the patient. Radiographs, labs and medication list were reviewed by me independently. I spoke with bedside nursing, therapists and consultants. Critical care services and times documented are independent of procedures and multidisciplinary rounds with Residents. Additionally comprehensive, multidisciplinary rounds were conducted with the MICU team. The case was discussed in detail and plans for care were established. Review of Residents documentation was conducted and revisions were made as appropriate. I agree with the above documented exam, problem list and plan of care.   Amrai Johnson MD,[facility]  Pulmonary&Critical Care Medicine   Director of [facility]  Medical Director of [address] [place] [address]    Suellen Nava

## 2021-08-13 LAB — MRSA CULTURE ONLY: NORMAL

## 2022-10-15 ENCOUNTER — HOSPITAL ENCOUNTER (EMERGENCY)
Facility: HOSPITAL | Age: 27
Discharge: HOME | End: 2022-10-15
Attending: EMERGENCY MEDICINE | Admitting: EMERGENCY MEDICINE

## 2022-10-15 ENCOUNTER — APPOINTMENT (OUTPATIENT)
Dept: GENERAL RADIOLOGY | Facility: HOSPITAL | Age: 27
End: 2022-10-15

## 2022-10-15 VITALS
BODY MASS INDEX: 19 KG/M2 | HEART RATE: 69 BPM | RESPIRATION RATE: 16 BRPM | DIASTOLIC BLOOD PRESSURE: 78 MMHG | SYSTOLIC BLOOD PRESSURE: 131 MMHG | WEIGHT: 121.03 LBS | HEIGHT: 67 IN | OXYGEN SATURATION: 94 % | TEMPERATURE: 97.9 F

## 2022-10-15 DIAGNOSIS — S33.5XXA LUMBAR SPRAIN, INITIAL ENCOUNTER: ICD-10-CM

## 2022-10-15 DIAGNOSIS — S23.9XXA THORACIC SPRAIN: Primary | ICD-10-CM

## 2022-10-15 PROCEDURE — 72110 X-RAY EXAM L-2 SPINE 4/>VWS: CPT

## 2022-10-15 PROCEDURE — 72072 X-RAY EXAM THORAC SPINE 3VWS: CPT

## 2022-10-15 PROCEDURE — 99283 EMERGENCY DEPT VISIT LOW MDM: CPT

## 2022-10-15 RX ORDER — IBUPROFEN 600 MG/1
600 TABLET ORAL ONCE
Status: COMPLETED | OUTPATIENT
Start: 2022-10-15 | End: 2022-10-15

## 2022-10-15 RX ADMIN — IBUPROFEN 600 MG: 600 TABLET, FILM COATED ORAL at 17:01

## 2022-10-15 NOTE — ED NOTES
Pt ambulatory with steady gait upon discharge from ED.  No s/s of distress.  Pt verbalized understanding of all discharge instructions.

## 2022-10-15 NOTE — ED PROVIDER NOTES
"Subjective   History of Present Illness  Chief complaint: Patient is a pleasant 27-year-old.  He was at work just shortly prior to arrival and was carrying boxes.  He is a .  Anywhere between 15 to 50 pounds is typical to carry weight.  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  He stepped into a hole and lost his balance.  In order to keep the box in his arms he had recaptured in the ear.  Pulled his lower back and mid back.  He has pain from his lower thoracic down to his lower lumbar.  No radiation of the pain.  Notes that tightening pain.  It is moderate.  He has no numbness or weakness in his legs he is ambulating effectively.  He states it hurts mostly when he \"bends over\".  He states he went to an [facility] urgent care and when he told him that was a work injury they told him he had to go immediately to the emergency room so he came here.    Context:    Duration: Sudden onset a couple of hours prior to arrival    Timing: Sudden    Severity: Moderate    Associated Symptoms: No other injury.  No head injury.  No pain radiating into his chest or abdomen.  No shortness of breath.        PCP:  LMP:        Review of Systems   Respiratory: Negative.    Cardiovascular: Negative.    Gastrointestinal: Negative.    Musculoskeletal: Positive for back pain.   Skin: Negative.    Neurological: Negative for weakness, numbness and headaches.       No past medical history on file.    No Known Allergies    No past surgical history on file.    No family history on file.             Objective   Physical Exam  Vitals and nursing note reviewed.   Constitutional:       Appearance: Normal appearance.   HENT:      Head: Normocephalic and atraumatic.   Cardiovascular:      Rate and Rhythm: Normal rate and regular rhythm.   Musculoskeletal:      Thoracic back: Tenderness present.      Lumbar back: Tenderness present.        Back:    Neurological:      Mental Status: He is alert.         Procedures           ED " Course          XR Spine Thoracic 3 View    Result Date: 10/15/2022  No radiographic findings of acute thoracic spine abnormality.  Electronically Signed By-Renny Michaels MD On:10/15/2022 5:02 PM This report was finalized on [id number] by  Renny Michaels MD.    XR Spine Lumbar Complete 4+VW    Result Date: 10/15/2022  No radiographic findings of acute lumbar spine abnormality.  Electronically Signed By-Renny Michaels MD On:10/15/2022 5:01 PM This report was finalized on [id number] by  Renny Michaels MD.                                      MDM  Number of Diagnoses or Management Options  Diagnosis management comments: Patient shows no x-rays of his thoracolumbar spine.  No neurologic emergencies on exam.  He is ambulating fine.  He likely strained his mid and low back when he missed stepped into a hole and lost control of the box.  We will have the patient follow-up with occupational health.       Amount and/or Complexity of Data Reviewed  Tests in the radiology section of CPT®: reviewed  Independent visualization of images, tracings, or specimens: yes    Patient Progress  Patient progress: stable      Final diagnoses:   None     Thoracolumbar sprain  ED Disposition  ED Disposition     None          No follow-up provider specified.       Medication List      No changes were made to your prescriptions during this visit.          Andres Vickers,   10/15/22 7241